# Patient Record
Sex: FEMALE | Race: WHITE | NOT HISPANIC OR LATINO
[De-identification: names, ages, dates, MRNs, and addresses within clinical notes are randomized per-mention and may not be internally consistent; named-entity substitution may affect disease eponyms.]

---

## 2023-11-13 ENCOUNTER — TRANSCRIPTION ENCOUNTER (OUTPATIENT)
Age: 38
End: 2023-11-13

## 2023-11-14 ENCOUNTER — TRANSCRIPTION ENCOUNTER (OUTPATIENT)
Age: 38
End: 2023-11-14

## 2023-11-14 ENCOUNTER — RESULT REVIEW (OUTPATIENT)
Age: 38
End: 2023-11-14

## 2023-11-14 ENCOUNTER — INPATIENT (INPATIENT)
Facility: HOSPITAL | Age: 38
LOS: 0 days | Discharge: HOME | End: 2023-11-15
Attending: SURGERY | Admitting: SURGERY
Payer: MEDICAID

## 2023-11-14 VITALS
OXYGEN SATURATION: 100 % | HEIGHT: 68 IN | RESPIRATION RATE: 16 BRPM | TEMPERATURE: 98 F | WEIGHT: 240.08 LBS | HEART RATE: 77 BPM | DIASTOLIC BLOOD PRESSURE: 103 MMHG | SYSTOLIC BLOOD PRESSURE: 165 MMHG

## 2023-11-14 DIAGNOSIS — Z98.84 BARIATRIC SURGERY STATUS: Chronic | ICD-10-CM

## 2023-11-14 LAB
ALBUMIN SERPL ELPH-MCNC: 3.7 G/DL — SIGNIFICANT CHANGE UP (ref 3.3–5)
ALBUMIN SERPL ELPH-MCNC: 3.7 G/DL — SIGNIFICANT CHANGE UP (ref 3.3–5)
ALP SERPL-CCNC: 67 U/L — SIGNIFICANT CHANGE UP (ref 40–120)
ALP SERPL-CCNC: 67 U/L — SIGNIFICANT CHANGE UP (ref 40–120)
ALT FLD-CCNC: 27 U/L — SIGNIFICANT CHANGE UP (ref 12–78)
ALT FLD-CCNC: 27 U/L — SIGNIFICANT CHANGE UP (ref 12–78)
ANION GAP SERPL CALC-SCNC: 6 MMOL/L — SIGNIFICANT CHANGE UP (ref 5–17)
ANION GAP SERPL CALC-SCNC: 6 MMOL/L — SIGNIFICANT CHANGE UP (ref 5–17)
APPEARANCE UR: CLEAR — SIGNIFICANT CHANGE UP
APPEARANCE UR: CLEAR — SIGNIFICANT CHANGE UP
APTT BLD: 31.9 SEC — SIGNIFICANT CHANGE UP (ref 24.5–35.6)
APTT BLD: 31.9 SEC — SIGNIFICANT CHANGE UP (ref 24.5–35.6)
AST SERPL-CCNC: 15 U/L — SIGNIFICANT CHANGE UP (ref 15–37)
AST SERPL-CCNC: 15 U/L — SIGNIFICANT CHANGE UP (ref 15–37)
BASOPHILS # BLD AUTO: 0.02 K/UL — SIGNIFICANT CHANGE UP (ref 0–0.2)
BASOPHILS # BLD AUTO: 0.02 K/UL — SIGNIFICANT CHANGE UP (ref 0–0.2)
BASOPHILS NFR BLD AUTO: 0.2 % — SIGNIFICANT CHANGE UP (ref 0–2)
BASOPHILS NFR BLD AUTO: 0.2 % — SIGNIFICANT CHANGE UP (ref 0–2)
BILIRUB SERPL-MCNC: 0.8 MG/DL — SIGNIFICANT CHANGE UP (ref 0.2–1.2)
BILIRUB SERPL-MCNC: 0.8 MG/DL — SIGNIFICANT CHANGE UP (ref 0.2–1.2)
BILIRUB UR-MCNC: NEGATIVE — SIGNIFICANT CHANGE UP
BILIRUB UR-MCNC: NEGATIVE — SIGNIFICANT CHANGE UP
BLD GP AB SCN SERPL QL: SIGNIFICANT CHANGE UP
BLD GP AB SCN SERPL QL: SIGNIFICANT CHANGE UP
BUN SERPL-MCNC: 7 MG/DL — SIGNIFICANT CHANGE UP (ref 7–23)
BUN SERPL-MCNC: 7 MG/DL — SIGNIFICANT CHANGE UP (ref 7–23)
CALCIUM SERPL-MCNC: 9 MG/DL — SIGNIFICANT CHANGE UP (ref 8.5–10.1)
CALCIUM SERPL-MCNC: 9 MG/DL — SIGNIFICANT CHANGE UP (ref 8.5–10.1)
CHLORIDE SERPL-SCNC: 102 MMOL/L — SIGNIFICANT CHANGE UP (ref 96–108)
CHLORIDE SERPL-SCNC: 102 MMOL/L — SIGNIFICANT CHANGE UP (ref 96–108)
CO2 SERPL-SCNC: 28 MMOL/L — SIGNIFICANT CHANGE UP (ref 22–31)
CO2 SERPL-SCNC: 28 MMOL/L — SIGNIFICANT CHANGE UP (ref 22–31)
COLOR SPEC: YELLOW — SIGNIFICANT CHANGE UP
COLOR SPEC: YELLOW — SIGNIFICANT CHANGE UP
CREAT SERPL-MCNC: 0.72 MG/DL — SIGNIFICANT CHANGE UP (ref 0.5–1.3)
CREAT SERPL-MCNC: 0.72 MG/DL — SIGNIFICANT CHANGE UP (ref 0.5–1.3)
DIFF PNL FLD: NEGATIVE — SIGNIFICANT CHANGE UP
DIFF PNL FLD: NEGATIVE — SIGNIFICANT CHANGE UP
EGFR: 110 ML/MIN/1.73M2 — SIGNIFICANT CHANGE UP
EGFR: 110 ML/MIN/1.73M2 — SIGNIFICANT CHANGE UP
EOSINOPHIL # BLD AUTO: 0.03 K/UL — SIGNIFICANT CHANGE UP (ref 0–0.5)
EOSINOPHIL # BLD AUTO: 0.03 K/UL — SIGNIFICANT CHANGE UP (ref 0–0.5)
EOSINOPHIL NFR BLD AUTO: 0.3 % — SIGNIFICANT CHANGE UP (ref 0–6)
EOSINOPHIL NFR BLD AUTO: 0.3 % — SIGNIFICANT CHANGE UP (ref 0–6)
GLUCOSE SERPL-MCNC: 97 MG/DL — SIGNIFICANT CHANGE UP (ref 70–99)
GLUCOSE SERPL-MCNC: 97 MG/DL — SIGNIFICANT CHANGE UP (ref 70–99)
GLUCOSE UR QL: NEGATIVE MG/DL — SIGNIFICANT CHANGE UP
GLUCOSE UR QL: NEGATIVE MG/DL — SIGNIFICANT CHANGE UP
HCG SERPL-ACNC: <1 MIU/ML — SIGNIFICANT CHANGE UP
HCG SERPL-ACNC: <1 MIU/ML — SIGNIFICANT CHANGE UP
HCT VFR BLD CALC: 34.1 % — LOW (ref 34.5–45)
HCT VFR BLD CALC: 34.1 % — LOW (ref 34.5–45)
HGB BLD-MCNC: 11.7 G/DL — SIGNIFICANT CHANGE UP (ref 11.5–15.5)
HGB BLD-MCNC: 11.7 G/DL — SIGNIFICANT CHANGE UP (ref 11.5–15.5)
IMM GRANULOCYTES NFR BLD AUTO: 0.3 % — SIGNIFICANT CHANGE UP (ref 0–0.9)
IMM GRANULOCYTES NFR BLD AUTO: 0.3 % — SIGNIFICANT CHANGE UP (ref 0–0.9)
INR BLD: 1.02 RATIO — SIGNIFICANT CHANGE UP (ref 0.85–1.18)
INR BLD: 1.02 RATIO — SIGNIFICANT CHANGE UP (ref 0.85–1.18)
KETONES UR-MCNC: NEGATIVE MG/DL — SIGNIFICANT CHANGE UP
KETONES UR-MCNC: NEGATIVE MG/DL — SIGNIFICANT CHANGE UP
LACTATE SERPL-SCNC: 0.6 MMOL/L — LOW (ref 0.7–2)
LACTATE SERPL-SCNC: 0.6 MMOL/L — LOW (ref 0.7–2)
LEUKOCYTE ESTERASE UR-ACNC: NEGATIVE — SIGNIFICANT CHANGE UP
LEUKOCYTE ESTERASE UR-ACNC: NEGATIVE — SIGNIFICANT CHANGE UP
LIDOCAIN IGE QN: 28 U/L — SIGNIFICANT CHANGE UP (ref 13–75)
LIDOCAIN IGE QN: 28 U/L — SIGNIFICANT CHANGE UP (ref 13–75)
LYMPHOCYTES # BLD AUTO: 1.83 K/UL — SIGNIFICANT CHANGE UP (ref 1–3.3)
LYMPHOCYTES # BLD AUTO: 1.83 K/UL — SIGNIFICANT CHANGE UP (ref 1–3.3)
LYMPHOCYTES # BLD AUTO: 20.7 % — SIGNIFICANT CHANGE UP (ref 13–44)
LYMPHOCYTES # BLD AUTO: 20.7 % — SIGNIFICANT CHANGE UP (ref 13–44)
MCHC RBC-ENTMCNC: 28.2 PG — SIGNIFICANT CHANGE UP (ref 27–34)
MCHC RBC-ENTMCNC: 28.2 PG — SIGNIFICANT CHANGE UP (ref 27–34)
MCHC RBC-ENTMCNC: 34.3 G/DL — SIGNIFICANT CHANGE UP (ref 32–36)
MCHC RBC-ENTMCNC: 34.3 G/DL — SIGNIFICANT CHANGE UP (ref 32–36)
MCV RBC AUTO: 82.2 FL — SIGNIFICANT CHANGE UP (ref 80–100)
MCV RBC AUTO: 82.2 FL — SIGNIFICANT CHANGE UP (ref 80–100)
MONOCYTES # BLD AUTO: 0.79 K/UL — SIGNIFICANT CHANGE UP (ref 0–0.9)
MONOCYTES # BLD AUTO: 0.79 K/UL — SIGNIFICANT CHANGE UP (ref 0–0.9)
MONOCYTES NFR BLD AUTO: 8.9 % — SIGNIFICANT CHANGE UP (ref 2–14)
MONOCYTES NFR BLD AUTO: 8.9 % — SIGNIFICANT CHANGE UP (ref 2–14)
NEUTROPHILS # BLD AUTO: 6.15 K/UL — SIGNIFICANT CHANGE UP (ref 1.8–7.4)
NEUTROPHILS # BLD AUTO: 6.15 K/UL — SIGNIFICANT CHANGE UP (ref 1.8–7.4)
NEUTROPHILS NFR BLD AUTO: 69.6 % — SIGNIFICANT CHANGE UP (ref 43–77)
NEUTROPHILS NFR BLD AUTO: 69.6 % — SIGNIFICANT CHANGE UP (ref 43–77)
NITRITE UR-MCNC: NEGATIVE — SIGNIFICANT CHANGE UP
NITRITE UR-MCNC: NEGATIVE — SIGNIFICANT CHANGE UP
NRBC # BLD: 0 /100 WBCS — SIGNIFICANT CHANGE UP (ref 0–0)
NRBC # BLD: 0 /100 WBCS — SIGNIFICANT CHANGE UP (ref 0–0)
PH UR: 7 — SIGNIFICANT CHANGE UP (ref 5–8)
PH UR: 7 — SIGNIFICANT CHANGE UP (ref 5–8)
PLATELET # BLD AUTO: 274 K/UL — SIGNIFICANT CHANGE UP (ref 150–400)
PLATELET # BLD AUTO: 274 K/UL — SIGNIFICANT CHANGE UP (ref 150–400)
POTASSIUM SERPL-MCNC: 3.5 MMOL/L — SIGNIFICANT CHANGE UP (ref 3.5–5.3)
POTASSIUM SERPL-MCNC: 3.5 MMOL/L — SIGNIFICANT CHANGE UP (ref 3.5–5.3)
POTASSIUM SERPL-SCNC: 3.5 MMOL/L — SIGNIFICANT CHANGE UP (ref 3.5–5.3)
POTASSIUM SERPL-SCNC: 3.5 MMOL/L — SIGNIFICANT CHANGE UP (ref 3.5–5.3)
PROT SERPL-MCNC: 7.5 GM/DL — SIGNIFICANT CHANGE UP (ref 6–8.3)
PROT SERPL-MCNC: 7.5 GM/DL — SIGNIFICANT CHANGE UP (ref 6–8.3)
PROT UR-MCNC: NEGATIVE MG/DL — SIGNIFICANT CHANGE UP
PROT UR-MCNC: NEGATIVE MG/DL — SIGNIFICANT CHANGE UP
PROTHROM AB SERPL-ACNC: 12.1 SEC — SIGNIFICANT CHANGE UP (ref 9.5–13)
PROTHROM AB SERPL-ACNC: 12.1 SEC — SIGNIFICANT CHANGE UP (ref 9.5–13)
RBC # BLD: 4.15 M/UL — SIGNIFICANT CHANGE UP (ref 3.8–5.2)
RBC # BLD: 4.15 M/UL — SIGNIFICANT CHANGE UP (ref 3.8–5.2)
RBC # FLD: 13.2 % — SIGNIFICANT CHANGE UP (ref 10.3–14.5)
RBC # FLD: 13.2 % — SIGNIFICANT CHANGE UP (ref 10.3–14.5)
SODIUM SERPL-SCNC: 136 MMOL/L — SIGNIFICANT CHANGE UP (ref 135–145)
SODIUM SERPL-SCNC: 136 MMOL/L — SIGNIFICANT CHANGE UP (ref 135–145)
SP GR SPEC: 1.01 — SIGNIFICANT CHANGE UP (ref 1–1.03)
SP GR SPEC: 1.01 — SIGNIFICANT CHANGE UP (ref 1–1.03)
UROBILINOGEN FLD QL: 1 MG/DL — SIGNIFICANT CHANGE UP (ref 0.2–1)
UROBILINOGEN FLD QL: 1 MG/DL — SIGNIFICANT CHANGE UP (ref 0.2–1)
WBC # BLD: 8.85 K/UL — SIGNIFICANT CHANGE UP (ref 3.8–10.5)
WBC # BLD: 8.85 K/UL — SIGNIFICANT CHANGE UP (ref 3.8–10.5)
WBC # FLD AUTO: 8.85 K/UL — SIGNIFICANT CHANGE UP (ref 3.8–10.5)
WBC # FLD AUTO: 8.85 K/UL — SIGNIFICANT CHANGE UP (ref 3.8–10.5)

## 2023-11-14 PROCEDURE — 88300 SURGICAL PATH GROSS: CPT | Mod: 26

## 2023-11-14 PROCEDURE — 99223 1ST HOSP IP/OBS HIGH 75: CPT | Mod: 57,25

## 2023-11-14 PROCEDURE — 43772 LAP RMVL GASTR ADJ DEVICE: CPT | Mod: AS

## 2023-11-14 PROCEDURE — 43774 LAP RMVL GASTR ADJ ALL PARTS: CPT

## 2023-11-14 PROCEDURE — 71045 X-RAY EXAM CHEST 1 VIEW: CPT | Mod: 26

## 2023-11-14 PROCEDURE — 43235 EGD DIAGNOSTIC BRUSH WASH: CPT

## 2023-11-14 PROCEDURE — 74177 CT ABD & PELVIS W/CONTRAST: CPT | Mod: 26,MC

## 2023-11-14 PROCEDURE — 93010 ELECTROCARDIOGRAM REPORT: CPT

## 2023-11-14 PROCEDURE — 99285 EMERGENCY DEPT VISIT HI MDM: CPT

## 2023-11-14 RX ORDER — HYDROMORPHONE HYDROCHLORIDE 2 MG/ML
1 INJECTION INTRAMUSCULAR; INTRAVENOUS; SUBCUTANEOUS EVERY 6 HOURS
Refills: 0 | Status: DISCONTINUED | OUTPATIENT
Start: 2023-11-14 | End: 2023-11-14

## 2023-11-14 RX ORDER — HEPARIN SODIUM 5000 [USP'U]/ML
5000 INJECTION INTRAVENOUS; SUBCUTANEOUS EVERY 8 HOURS
Refills: 0 | Status: DISCONTINUED | OUTPATIENT
Start: 2023-11-15 | End: 2023-11-15

## 2023-11-14 RX ORDER — ONDANSETRON 8 MG/1
4 TABLET, FILM COATED ORAL ONCE
Refills: 0 | Status: COMPLETED | OUTPATIENT
Start: 2023-11-14 | End: 2023-11-14

## 2023-11-14 RX ORDER — SODIUM CHLORIDE 9 MG/ML
1000 INJECTION, SOLUTION INTRAVENOUS
Refills: 0 | Status: DISCONTINUED | OUTPATIENT
Start: 2023-11-14 | End: 2023-11-15

## 2023-11-14 RX ORDER — FAMOTIDINE 10 MG/ML
20 INJECTION INTRAVENOUS ONCE
Refills: 0 | Status: COMPLETED | OUTPATIENT
Start: 2023-11-14 | End: 2023-11-14

## 2023-11-14 RX ORDER — ACETAMINOPHEN 500 MG
1000 TABLET ORAL ONCE
Refills: 0 | Status: COMPLETED | OUTPATIENT
Start: 2023-11-15 | End: 2023-11-15

## 2023-11-14 RX ORDER — CEFAZOLIN SODIUM 1 G
2000 VIAL (EA) INJECTION EVERY 8 HOURS
Refills: 0 | Status: COMPLETED | OUTPATIENT
Start: 2023-11-14 | End: 2023-11-15

## 2023-11-14 RX ORDER — ACETAMINOPHEN 500 MG
1000 TABLET ORAL ONCE
Refills: 0 | Status: COMPLETED | OUTPATIENT
Start: 2023-11-14 | End: 2023-11-14

## 2023-11-14 RX ORDER — OXYCODONE HYDROCHLORIDE 5 MG/1
5 TABLET ORAL EVERY 4 HOURS
Refills: 0 | Status: DISCONTINUED | OUTPATIENT
Start: 2023-11-14 | End: 2023-11-15

## 2023-11-14 RX ORDER — ONDANSETRON 8 MG/1
4 TABLET, FILM COATED ORAL ONCE
Refills: 0 | Status: DISCONTINUED | OUTPATIENT
Start: 2023-11-14 | End: 2023-11-14

## 2023-11-14 RX ORDER — SODIUM CHLORIDE 9 MG/ML
1000 INJECTION INTRAMUSCULAR; INTRAVENOUS; SUBCUTANEOUS ONCE
Refills: 0 | Status: COMPLETED | OUTPATIENT
Start: 2023-11-14 | End: 2023-11-14

## 2023-11-14 RX ORDER — IOHEXOL 300 MG/ML
30 INJECTION, SOLUTION INTRAVENOUS ONCE
Refills: 0 | Status: COMPLETED | OUTPATIENT
Start: 2023-11-14 | End: 2023-11-14

## 2023-11-14 RX ORDER — SODIUM CHLORIDE 9 MG/ML
2000 INJECTION, SOLUTION INTRAVENOUS ONCE
Refills: 0 | Status: COMPLETED | OUTPATIENT
Start: 2023-11-14 | End: 2023-11-14

## 2023-11-14 RX ORDER — SODIUM CHLORIDE 9 MG/ML
1000 INJECTION, SOLUTION INTRAVENOUS
Refills: 0 | Status: DISCONTINUED | OUTPATIENT
Start: 2023-11-14 | End: 2023-11-14

## 2023-11-14 RX ORDER — HYDROMORPHONE HYDROCHLORIDE 2 MG/ML
0.5 INJECTION INTRAMUSCULAR; INTRAVENOUS; SUBCUTANEOUS
Refills: 0 | Status: DISCONTINUED | OUTPATIENT
Start: 2023-11-14 | End: 2023-11-14

## 2023-11-14 RX ORDER — HYDROMORPHONE HYDROCHLORIDE 2 MG/ML
1 INJECTION INTRAMUSCULAR; INTRAVENOUS; SUBCUTANEOUS
Refills: 0 | Status: DISCONTINUED | OUTPATIENT
Start: 2023-11-14 | End: 2023-11-14

## 2023-11-14 RX ORDER — HEPARIN SODIUM 5000 [USP'U]/ML
5000 INJECTION INTRAVENOUS; SUBCUTANEOUS ONCE
Refills: 0 | Status: DISCONTINUED | OUTPATIENT
Start: 2023-11-14 | End: 2023-11-14

## 2023-11-14 RX ORDER — OXYCODONE HYDROCHLORIDE 5 MG/1
10 TABLET ORAL EVERY 6 HOURS
Refills: 0 | Status: DISCONTINUED | OUTPATIENT
Start: 2023-11-14 | End: 2023-11-15

## 2023-11-14 RX ADMIN — HYDROMORPHONE HYDROCHLORIDE 0.5 MILLIGRAM(S): 2 INJECTION INTRAMUSCULAR; INTRAVENOUS; SUBCUTANEOUS at 22:26

## 2023-11-14 RX ADMIN — SODIUM CHLORIDE 2000 MILLILITER(S): 9 INJECTION, SOLUTION INTRAVENOUS at 18:24

## 2023-11-14 RX ADMIN — SODIUM CHLORIDE 1000 MILLILITER(S): 9 INJECTION INTRAMUSCULAR; INTRAVENOUS; SUBCUTANEOUS at 12:57

## 2023-11-14 RX ADMIN — Medication 400 MILLIGRAM(S): at 22:54

## 2023-11-14 RX ADMIN — HYDROMORPHONE HYDROCHLORIDE 0.5 MILLIGRAM(S): 2 INJECTION INTRAMUSCULAR; INTRAVENOUS; SUBCUTANEOUS at 22:09

## 2023-11-14 RX ADMIN — FAMOTIDINE 20 MILLIGRAM(S): 10 INJECTION INTRAVENOUS at 12:57

## 2023-11-14 RX ADMIN — ONDANSETRON 4 MILLIGRAM(S): 8 TABLET, FILM COATED ORAL at 23:30

## 2023-11-14 RX ADMIN — IOHEXOL 30 MILLILITER(S): 300 INJECTION, SOLUTION INTRAVENOUS at 13:06

## 2023-11-14 RX ADMIN — Medication 1000 MILLIGRAM(S): at 23:54

## 2023-11-14 RX ADMIN — ONDANSETRON 4 MILLIGRAM(S): 8 TABLET, FILM COATED ORAL at 12:57

## 2023-11-14 RX ADMIN — SODIUM CHLORIDE 100 MILLILITER(S): 9 INJECTION, SOLUTION INTRAVENOUS at 22:00

## 2023-11-14 RX ADMIN — SODIUM CHLORIDE 1000 MILLILITER(S): 9 INJECTION INTRAMUSCULAR; INTRAVENOUS; SUBCUTANEOUS at 13:52

## 2023-11-14 RX ADMIN — SODIUM CHLORIDE 150 MILLILITER(S): 9 INJECTION, SOLUTION INTRAVENOUS at 23:00

## 2023-11-14 NOTE — ED ADULT TRIAGE NOTE - CHIEF COMPLAINT QUOTE
Patient states I have a lap band placed in 2013 and have epigastric and upper abd pain, has not eaten in 6 days and has been vomiting. Hx iron deficiency, last transfusion 9 months ago

## 2023-11-14 NOTE — ED PROVIDER NOTE - CLINICAL SUMMARY MEDICAL DECISION MAKING FREE TEXT BOX
38F PMH anemia, PSH lap band pw N/V x6 days, unable to tolerate PO intake. Afebrile, VSS. Well appearing, in NAD. Exam as noted in PE. Plan for CBC, CMP, lipase, lactate, HCG, coags, T&S, UA/C & CT AP w/ PO & IV contrast. Give IVF, Pepcid, Zofran. Re-eval. 38F PMH anemia, PSH lap band pw N/V x6 days, unable to tolerate PO intake. Afebrile, VSS. Well appearing, in NAD. Exam as noted in PE. Plan for CBC, CMP, lipase, lactate, HCG, coags, T&S, UA/C & CT AP w/ PO & IV contrast. Give IVF, Pepcid, Zofran. Re-eval.  W/u significant for: C 38F PMH anemia, PSH lap band pw N/V x6 days, unable to tolerate PO intake. Afebrile, VSS. Well appearing, in NAD. Exam as noted in PE. Plan for CBC, CMP, lipase, lactate, HCG, coags, T&S, UA/C & CT AP w/ PO & IV contrast. Give IVF, Pepcid, Zofran. Re-eval.  W/u significant for: CT AP w/ + gastric band slippage inferiorly, apparent erosion of the gastric band into the wall of the greater curvature of the stomach.  D/w [facility]: 38F PMH anemia, PSH lap band pw N/V x6 days, unable to tolerate PO intake. Afebrile, VSS. Well appearing, in NAD. Exam as noted in PE. Plan for CBC, CMP, lipase, lactate, HCG, coags, T&S, UA/C & CT AP w/ PO & IV contrast. Give IVF, Pepcid, Zofran. Re-eval.  W/u significant for: CT AP w/ + gastric band slippage inferiorly, apparent erosion of the gastric band into the wall of the greater curvature of the stomach.  D/w Dr Veloz ([facility]): Will admit to surgical service, plan for OR today. Pt updated to results, admission & plan for OR. She understands / agrees w/ this plan.

## 2023-11-14 NOTE — H&P ADULT - NSHPPHYSICALEXAM_GEN_ALL_CORE
Constitutional: WDWN resting comfortably in bed; NAD  HEENT: NC/AT, PERRL, EOMI, anicteric sclera, no nasal discharge; uvula midline, no oropharyngeal erythema or exudates  Neck: supple; no JVD or thyromegaly  Respiratory: CTA B/L; no W/R/R, no retractions  Cardiac: +S1/S2; RRR; no M/R/G; PMI non-displaced  Gastrointestinal: soft, + LUQ and epigastric TTP  Extremities: , no clubbing or cyanosis; no peripheral edema  Musculoskeletal:  no joint swelling, tenderness or erythema  Vascular: 2+ radial, femoral, DP/PT pulses B/L  Skin: warm, dry and intact; no rashes, wounds, or scars  Neurologic: AAOx3; CNS grossly intact; no focal deficits

## 2023-11-14 NOTE — ED PROVIDER NOTE - OBJECTIVE STATEMENT
38F PMH iron-deficiency anemia pw N/V, unable to tolerate PO intake x6 days. Pt endorses chest pressure / sticking sensation w/ attempted PO intake, followed by emesis. + LUQ pain. Pt reports awakening w/ choking sensation at night. + mental fog. Denies F/C, h/a, dizziness, CP, SOB, cough, flank pain, diarrhea, constipation, black or bloody BMs, dysuria, hematuria, LE pain / swelling. Pt reports normal BMs, but decreased urination 2/2 poor PO intake. Denies recent travel, sick contacts, changes in diet. Pt states w/o iron-transfusion x10mo, pt moved to [place] from [place] 5mo ago. Pt states was going for iron-transfusion 3x/mo previously.     PMH as above, PSH lap band 2013 ([place], [place]), NKDA, meds as listed. LMP 11/5. 38F PMH iron-deficiency anemia pw N/V, unable to tolerate PO intake x6 days. Pt endorses chest pressure / sticking sensation w/ attempted PO intake, followed by emesis. + LUQ pain. Pt reports awakening w/ choking sensation at night. + mental fog. Denies F/C, h/a, dizziness, CP, SOB, cough, flank pain, diarrhea, constipation, black or bloody BMs, dysuria, hematuria, LE pain / swelling. Pt reports normal BMs, but decreased urination 2/2 poor PO intake. Denies recent travel, sick contacts, changes in diet. Pt states w/o iron-transfusion x10mo, pt moved to [place] from [place] 5mo ago. Pt states was going for iron-transfusion 3x/mo previously.     PMH as above, PSH lap band 2013 ([place], [place]), NKDA, intolerance - ASA, Meds none. LMP 11/5, denies possibility of pregnancy.

## 2023-11-14 NOTE — H&P ADULT - ASSESSMENT
37 y/o female with Gastric band malfunction   PMH Laparoscopic Gastric band for morbid obesity, LUPE    NPO, IVF resuscitation  booked for Emergent Laparoscopic removal of Gastric Band,  Upper endoscopy , poss. exploratory laparotomy,   Discussed with Dr Veloz   39 y/o female with Gastric band malfunction   PMH Laparoscopic Gastric band for morbid obesity, LUPE    NPO, IVF resuscitation  booked for Emergent Laparoscopic removal of Gastric Band,  Upper endoscopy , Possible exploratory laparotomy,   Discussed with Dr Veloz

## 2023-11-14 NOTE — H&P ADULT - HISTORY OF PRESENT ILLNESS
38F PMH iron-deficiency anemia presented to ER with C/O N/V, unable to tolerate PO intake x6 days. Patient stated that she has stabbing epigastric and LUQ pain when she tries to eat PO intake, followed by emesis. Denies Fever, chills, Chest pain, SOB, diarrhea, constipation, hematochezia or melena.

## 2023-11-14 NOTE — ED PROVIDER NOTE - PHYSICAL EXAMINATION
GEN: Awake, alert, interactive, NAD.  HEAD AND NECK: NC/AT. Airway patent. Neck supple.   EYES:  Clear b/l. EOMI. PERRL.   ENT: Moist mucus membranes.   CARDIAC: Regular rate, regular rhythm. No evident pedal edema.    RESP/CHEST: Normal respiratory effort with no use of accessory muscles or retractions. Clear throughout on auscultation.  ABD: Soft, non-distended, + TTP LUQ. No rebound, no guarding. + firm mass palpable LUQ c/w lap band hx.   BACK: No midline spinal TTP. No CVAT.   EXTREMITIES: Moving all extremities with no apparent deformities.   SKIN: Warm, dry, intact normal color. No rash.   NEURO: AOx3, CN II-XII grossly intact, no focal deficits.   PSYCH: Appropriate mood and affect.

## 2023-11-14 NOTE — ED ADULT NURSE NOTE - OBJECTIVE STATEMENT
patient alert and oriented x4. patient 39 yo female c/o abd pain for the past 6 days. patient states she has history of lap band surgery in 2013 in [place] and had no complications. states she started having abd pain, and N/V r/t being unable to tolerate PO intake. pain in LUQ, pain 7/10. pale eye mucosa noted. patient also states she has history of iron deficiency and was receiving iron transfusion at her old hematologist in [place], but has not had an iron infusion in the past 10 months.

## 2023-11-14 NOTE — ED ADULT NURSE REASSESSMENT NOTE - NS ED NURSE REASSESS COMMENT FT1
Report received from RN at 7pm. Assessment available on KB. will continue to monitor. Patient awaiting transport to OR, Spoke with OR team at this time, transport pending, patient understanding at this time

## 2023-11-15 ENCOUNTER — TRANSCRIPTION ENCOUNTER (OUTPATIENT)
Age: 38
End: 2023-11-15

## 2023-11-15 VITALS
OXYGEN SATURATION: 97 % | TEMPERATURE: 99 F | RESPIRATION RATE: 16 BRPM | HEART RATE: 73 BPM | SYSTOLIC BLOOD PRESSURE: 113 MMHG | DIASTOLIC BLOOD PRESSURE: 73 MMHG

## 2023-11-15 LAB
ANION GAP SERPL CALC-SCNC: 2 MMOL/L — LOW (ref 5–17)
ANION GAP SERPL CALC-SCNC: 2 MMOL/L — LOW (ref 5–17)
BASOPHILS # BLD AUTO: 0.01 K/UL — SIGNIFICANT CHANGE UP (ref 0–0.2)
BASOPHILS # BLD AUTO: 0.01 K/UL — SIGNIFICANT CHANGE UP (ref 0–0.2)
BASOPHILS NFR BLD AUTO: 0.1 % — SIGNIFICANT CHANGE UP (ref 0–2)
BASOPHILS NFR BLD AUTO: 0.1 % — SIGNIFICANT CHANGE UP (ref 0–2)
BUN SERPL-MCNC: 8 MG/DL — SIGNIFICANT CHANGE UP (ref 7–23)
BUN SERPL-MCNC: 8 MG/DL — SIGNIFICANT CHANGE UP (ref 7–23)
CALCIUM SERPL-MCNC: 8.3 MG/DL — LOW (ref 8.5–10.1)
CALCIUM SERPL-MCNC: 8.3 MG/DL — LOW (ref 8.5–10.1)
CHLORIDE SERPL-SCNC: 107 MMOL/L — SIGNIFICANT CHANGE UP (ref 96–108)
CHLORIDE SERPL-SCNC: 107 MMOL/L — SIGNIFICANT CHANGE UP (ref 96–108)
CO2 SERPL-SCNC: 28 MMOL/L — SIGNIFICANT CHANGE UP (ref 22–31)
CO2 SERPL-SCNC: 28 MMOL/L — SIGNIFICANT CHANGE UP (ref 22–31)
CREAT SERPL-MCNC: 0.56 MG/DL — SIGNIFICANT CHANGE UP (ref 0.5–1.3)
CREAT SERPL-MCNC: 0.56 MG/DL — SIGNIFICANT CHANGE UP (ref 0.5–1.3)
EGFR: 120 ML/MIN/1.73M2 — SIGNIFICANT CHANGE UP
EGFR: 120 ML/MIN/1.73M2 — SIGNIFICANT CHANGE UP
EOSINOPHIL # BLD AUTO: 0 K/UL — SIGNIFICANT CHANGE UP (ref 0–0.5)
EOSINOPHIL # BLD AUTO: 0 K/UL — SIGNIFICANT CHANGE UP (ref 0–0.5)
EOSINOPHIL NFR BLD AUTO: 0 % — SIGNIFICANT CHANGE UP (ref 0–6)
EOSINOPHIL NFR BLD AUTO: 0 % — SIGNIFICANT CHANGE UP (ref 0–6)
GLUCOSE SERPL-MCNC: 120 MG/DL — HIGH (ref 70–99)
GLUCOSE SERPL-MCNC: 120 MG/DL — HIGH (ref 70–99)
HCT VFR BLD CALC: 31.8 % — LOW (ref 34.5–45)
HCT VFR BLD CALC: 31.8 % — LOW (ref 34.5–45)
HGB BLD-MCNC: 10.7 G/DL — LOW (ref 11.5–15.5)
HGB BLD-MCNC: 10.7 G/DL — LOW (ref 11.5–15.5)
IMM GRANULOCYTES NFR BLD AUTO: 0.5 % — SIGNIFICANT CHANGE UP (ref 0–0.9)
IMM GRANULOCYTES NFR BLD AUTO: 0.5 % — SIGNIFICANT CHANGE UP (ref 0–0.9)
LYMPHOCYTES # BLD AUTO: 0.83 K/UL — LOW (ref 1–3.3)
LYMPHOCYTES # BLD AUTO: 0.83 K/UL — LOW (ref 1–3.3)
LYMPHOCYTES # BLD AUTO: 10.5 % — LOW (ref 13–44)
LYMPHOCYTES # BLD AUTO: 10.5 % — LOW (ref 13–44)
MAGNESIUM SERPL-MCNC: 2.1 MG/DL — SIGNIFICANT CHANGE UP (ref 1.6–2.6)
MAGNESIUM SERPL-MCNC: 2.1 MG/DL — SIGNIFICANT CHANGE UP (ref 1.6–2.6)
MCHC RBC-ENTMCNC: 27.9 PG — SIGNIFICANT CHANGE UP (ref 27–34)
MCHC RBC-ENTMCNC: 27.9 PG — SIGNIFICANT CHANGE UP (ref 27–34)
MCHC RBC-ENTMCNC: 33.6 G/DL — SIGNIFICANT CHANGE UP (ref 32–36)
MCHC RBC-ENTMCNC: 33.6 G/DL — SIGNIFICANT CHANGE UP (ref 32–36)
MCV RBC AUTO: 82.8 FL — SIGNIFICANT CHANGE UP (ref 80–100)
MCV RBC AUTO: 82.8 FL — SIGNIFICANT CHANGE UP (ref 80–100)
MONOCYTES # BLD AUTO: 0.18 K/UL — SIGNIFICANT CHANGE UP (ref 0–0.9)
MONOCYTES # BLD AUTO: 0.18 K/UL — SIGNIFICANT CHANGE UP (ref 0–0.9)
MONOCYTES NFR BLD AUTO: 2.3 % — SIGNIFICANT CHANGE UP (ref 2–14)
MONOCYTES NFR BLD AUTO: 2.3 % — SIGNIFICANT CHANGE UP (ref 2–14)
NEUTROPHILS # BLD AUTO: 6.87 K/UL — SIGNIFICANT CHANGE UP (ref 1.8–7.4)
NEUTROPHILS # BLD AUTO: 6.87 K/UL — SIGNIFICANT CHANGE UP (ref 1.8–7.4)
NEUTROPHILS NFR BLD AUTO: 86.6 % — HIGH (ref 43–77)
NEUTROPHILS NFR BLD AUTO: 86.6 % — HIGH (ref 43–77)
NRBC # BLD: 0 /100 WBCS — SIGNIFICANT CHANGE UP (ref 0–0)
NRBC # BLD: 0 /100 WBCS — SIGNIFICANT CHANGE UP (ref 0–0)
PHOSPHATE SERPL-MCNC: 4.4 MG/DL — SIGNIFICANT CHANGE UP (ref 2.5–4.5)
PHOSPHATE SERPL-MCNC: 4.4 MG/DL — SIGNIFICANT CHANGE UP (ref 2.5–4.5)
PLATELET # BLD AUTO: 259 K/UL — SIGNIFICANT CHANGE UP (ref 150–400)
PLATELET # BLD AUTO: 259 K/UL — SIGNIFICANT CHANGE UP (ref 150–400)
POTASSIUM SERPL-MCNC: 3.9 MMOL/L — SIGNIFICANT CHANGE UP (ref 3.5–5.3)
POTASSIUM SERPL-MCNC: 3.9 MMOL/L — SIGNIFICANT CHANGE UP (ref 3.5–5.3)
POTASSIUM SERPL-SCNC: 3.9 MMOL/L — SIGNIFICANT CHANGE UP (ref 3.5–5.3)
POTASSIUM SERPL-SCNC: 3.9 MMOL/L — SIGNIFICANT CHANGE UP (ref 3.5–5.3)
RBC # BLD: 3.84 M/UL — SIGNIFICANT CHANGE UP (ref 3.8–5.2)
RBC # BLD: 3.84 M/UL — SIGNIFICANT CHANGE UP (ref 3.8–5.2)
RBC # FLD: 13.1 % — SIGNIFICANT CHANGE UP (ref 10.3–14.5)
RBC # FLD: 13.1 % — SIGNIFICANT CHANGE UP (ref 10.3–14.5)
SODIUM SERPL-SCNC: 137 MMOL/L — SIGNIFICANT CHANGE UP (ref 135–145)
SODIUM SERPL-SCNC: 137 MMOL/L — SIGNIFICANT CHANGE UP (ref 135–145)
WBC # BLD: 7.93 K/UL — SIGNIFICANT CHANGE UP (ref 3.8–10.5)
WBC # BLD: 7.93 K/UL — SIGNIFICANT CHANGE UP (ref 3.8–10.5)
WBC # FLD AUTO: 7.93 K/UL — SIGNIFICANT CHANGE UP (ref 3.8–10.5)
WBC # FLD AUTO: 7.93 K/UL — SIGNIFICANT CHANGE UP (ref 3.8–10.5)

## 2023-11-15 PROCEDURE — 99232 SBSQ HOSP IP/OBS MODERATE 35: CPT

## 2023-11-15 RX ORDER — ONDANSETRON 8 MG/1
4 TABLET, FILM COATED ORAL ONCE
Refills: 0 | Status: DISCONTINUED | OUTPATIENT
Start: 2023-11-15 | End: 2023-11-15

## 2023-11-15 RX ORDER — KETOROLAC TROMETHAMINE 30 MG/ML
15 SYRINGE (ML) INJECTION ONCE
Refills: 0 | Status: COMPLETED | OUTPATIENT
Start: 2023-11-15 | End: 2023-11-15

## 2023-11-15 RX ORDER — KETOROLAC TROMETHAMINE 30 MG/ML
15 SYRINGE (ML) INJECTION ONCE
Refills: 0 | Status: DISCONTINUED | OUTPATIENT
Start: 2023-11-15 | End: 2023-11-15

## 2023-11-15 RX ORDER — KETOROLAC TROMETHAMINE 30 MG/ML
1 SYRINGE (ML) INJECTION
Qty: 9 | Refills: 0
Start: 2023-11-15 | End: 2023-11-17

## 2023-11-15 RX ORDER — OXYCODONE HYDROCHLORIDE 5 MG/1
1 TABLET ORAL
Qty: 20 | Refills: 0
Start: 2023-11-15 | End: 2023-11-19

## 2023-11-15 RX ADMIN — Medication 100 MILLIGRAM(S): at 03:40

## 2023-11-15 RX ADMIN — Medication 1000 MILLIGRAM(S): at 06:07

## 2023-11-15 RX ADMIN — Medication 400 MILLIGRAM(S): at 05:07

## 2023-11-15 RX ADMIN — SODIUM CHLORIDE 150 MILLILITER(S): 9 INJECTION, SOLUTION INTRAVENOUS at 00:31

## 2023-11-15 RX ADMIN — Medication 15 MILLIGRAM(S): at 14:06

## 2023-11-15 RX ADMIN — OXYCODONE HYDROCHLORIDE 10 MILLIGRAM(S): 5 TABLET ORAL at 07:13

## 2023-11-15 RX ADMIN — HEPARIN SODIUM 5000 UNIT(S): 5000 INJECTION INTRAVENOUS; SUBCUTANEOUS at 05:07

## 2023-11-15 RX ADMIN — OXYCODONE HYDROCHLORIDE 10 MILLIGRAM(S): 5 TABLET ORAL at 06:13

## 2023-11-15 NOTE — DISCHARGE NOTE PROVIDER - NSDCCPCAREPLAN_GEN_ALL_CORE_FT
PRINCIPAL DISCHARGE DIAGNOSIS  Diagnosis: Gastric band slippage  Assessment and Plan of Treatment:

## 2023-11-15 NOTE — DISCHARGE NOTE PROVIDER - NSDCMRMEDTOKEN_GEN_ALL_CORE_FT
ketorolac 10 mg oral tablet: 1 tab(s) orally every 8 hours MDD: 3  oxyCODONE 5 mg oral tablet: 1 tab(s) orally every 6 hours as needed for  severe pain MDD: 4

## 2023-11-15 NOTE — PHARMACOTHERAPY INTERVENTION NOTE - COMMENTS
Spoke to patient Stevie on 11/15/23. Patient in for Gastric Band Slippage. Patient being discharged on pain medications. Ketorolac 10mg 1TAB Q8H and Oxycodone 5mg 1 TAB PO Q6H PRN. Spoke to patient and informed to take Ketoralac for mild pain and Oxycodone for mod/severe pain. Explained potential side effects of Oxycodone (drowsiness, n/v). Recommended to take medication as directed and do not exceed 4 tablets in a day. Patient understood the counseling and had no further questions.

## 2023-11-15 NOTE — DISCHARGE NOTE NURSING/CASE MANAGEMENT/SOCIAL WORK - PATIENT PORTAL LINK FT
You can access the FollowMyHealth Patient Portal offered by [facility] by registering at the following website: [email or web address]. By joining [employer]’s FollowMyHealth portal, you will also be able to view your health information using other applications (apps) compatible with our system.

## 2023-11-15 NOTE — DISCHARGE NOTE PROVIDER - HOSPITAL COURSE
Patient was admitted to [facility] for abdominal pain. Patient underwent Laparoscopic removal of gastric band secondary to Gastric band slippage. The patient was admitted to the surgical floor for observation and pain management. The remainder of the hospital stay was uneventful and patient was stable for discharge

## 2023-11-15 NOTE — PROGRESS NOTE ADULT - ASSESSMENT
37 Y/O female POD 0 s/p endoscopy with laparoscopic gastric band removal.         PLAN:     - F/U AM labs   - Multimodal pain control  - OOB as tolerated, walking as tolerated; DVT PPx; IS to be used hourly at bedside   - Discussed with Dr. Veloz

## 2023-11-15 NOTE — PROGRESS NOTE ADULT - SUBJECTIVE AND OBJECTIVE BOX
S/P endoscopy with laparoscopic gastric band removal   38y year old Female seen and examined at bedside. Admits to abdominal pain, well controlled with medication. Denies chest pain, shortness of breath, nausea/vomiting.    Vital Signs Last 24 Hrs  T(F): 98 (11-14-23 @ 23:45), Max: 98.7 (11-14-23 @ 13:13)  HR: 65 (11-14-23 @ 23:45)  BP: 131/83 (11-14-23 @ 23:45)  RR: 16 (11-14-23 @ 23:45)  SpO2: 98% (11-14-23 @ 23:45)  Wt(kg): --   CAPILLARY BLOOD GLUCOSE          GENERAL: Alert, NAD  CHEST/LUNG: Clear to auscultation bilaterally, respirations nonlabored  HEART: +S1S2, regular rate and rhythm  ABDOMEN: soft, nondistended. Appropriate incisional tenderness. Dressings clean dry intact x 4 over trocar sites  EXTREMITIES:  no calf tenderness, no edema. Intermittent pneumatic compression devices b/l LE      S/P endoscopy with laparoscopic gastric band removal   38y year old Female seen and examined at bedside. Admits to abdominal pain, well controlled with medication. Denies chest pain, shortness of breath, nausea/vomiting.    Vital Signs Last 24 Hrs  T(F): 98 (11-14-23 @ 23:45), Max: 98.7 (11-14-23 @ 13:13)  HR: 65 (11-14-23 @ 23:45)  BP: 131/83 (11-14-23 @ 23:45)  RR: 16 (11-14-23 @ 23:45)  SpO2: 98% (11-14-23 @ 23:45)  Wt(kg): --   CAPILLARY BLOOD GLUCOSE          GENERAL: Alert, NAD  CHEST/LUNG: Clear to auscultation bilaterally, respirations nonlabored  HEART: +S1S2, regular rate and rhythm  ABDOMEN: soft, nondistended. Appropriate incisional tenderness. Incision sites C/D/I with dermabond in place  EXTREMITIES:  no calf tenderness, no edema. Intermittent pneumatic compression devices b/l LE

## 2023-11-15 NOTE — PROGRESS NOTE ADULT - SUBJECTIVE AND OBJECTIVE BOX
SURGERY PROGRESS HPI:  POD#1  Pt seen and examined at bedside. Pain is well controlled on pain medication. Pt denies complaints. Pt tolerated a few small sips of water. Pt denies nausea and vomiting. Voiding well. Pt denies chest pain, SOB, dizziness, fever, chills. Ambulating.    Vital Signs Last 24 Hrs  T(C): 36.6 (15 Nov 2023 05:45), Max: 37.1 (2023 13:13)  T(F): 97.8 (15 Nov 2023 05:45), Max: 98.7 (2023 13:13)  HR: 62 (15 Nov 2023 05:45) (62 - 98)  BP: 100/66 (15 Nov 2023 05:45) (100/66 - 165/103)  BP(mean): --  RR: 16 (15 Nov 2023 05:45) (13 - 20)  SpO2: 95% (15 Nov 2023 05:45) (95% - 100%)    Parameters below as of 15 Nov 2023 05:45  Patient On (Oxygen Delivery Method): room air      PHYSICAL EXAM:    CONSTITUTIONAL: NAD  HEENT:  Atraumatic, Normocephalic  RESPIRATORY: Clear to ausculation, bilaterally   CARDIOVASCULAR: RRR S1S2  GASTROINTESTINAL: Dressings clean/dry/intact. Nondistended, soft, appropriate incisional tenderness  MUSCULOSKELETAL: calf soft, non tender b/l    I&O's Detail      LABS:                        11.7   8.85  )-----------( 274      ( 2023 13:00 )             34.1     11-14    136  |  102  |  7   ----------------------------<  97  3.5   |  28  |  0.72    Ca    9.0      2023 13:00    TPro  7.5  /  Alb  3.7  /  TBili  0.8  /  DBili  x   /  AST  15  /  ALT  27  /  AlkPhos  67  11-14    PT/INR - ( 2023 13:00 )   PT: 12.1 sec;   INR: 1.02 ratio       PTT - ( 2023 13:00 )  PTT:31.9 sec    Urinalysis Basic - ( 2023 13:30 )    Color: Yellow / Appearance: Clear / S.008 / pH: x  Gluc: x / Ketone: Negative mg/dL  / Bili: Negative / Urobili: 1.0 mg/dL   Blood: x / Protein: Negative mg/dL / Nitrite: Negative   Leuk Esterase: Negative / RBC: x / WBC x   Sq Epi: x / Non Sq Epi: x / Bacteria: x      Assessment: 38F s/p EGD, lap band removal POD#1    Plan:  -clear liquid diet  -pain management prn  -continue DVT prophylaxis, OOB, Ambulating  -continue incentive spirometer  -continue local wound care  -f/u labs

## 2023-11-15 NOTE — DISCHARGE NOTE NURSING/CASE MANAGEMENT/SOCIAL WORK - NSDCPEFALRISK_GEN_ALL_CORE
For information on Fall & Injury Prevention, visit: [email or web address] OR  [email or web address] OR  https://www.cdc.gov/steadi/patient.html

## 2023-11-15 NOTE — DISCHARGE NOTE PROVIDER - CARE PROVIDER_API CALL
Kee Veloz  Surgery  [address]  [place] [address]  Phone: [phone]  Fax: [phone]  Follow Up Time: 1 week

## 2023-11-15 NOTE — DISCHARGE NOTE NURSING/CASE MANAGEMENT/SOCIAL WORK - NSDCVIVACCINE_GEN_ALL_CORE_FT
No Vaccines Administered. Xolair Pregnancy And Lactation Text: This medication is Pregnancy Category B and is considered safe during pregnancy. This medication is excreted in breast milk.

## 2023-11-15 NOTE — DISCHARGE NOTE PROVIDER - NSDCFUADDINST_GEN_ALL_CORE_FT
Please no heavy lifting  bending twisting pushing or  pulling ,  May Shower in 2 days keep wound clean and dry. No work or driving. Alternate with Tylenol/ Motrin for pain.  Take OTC stool softeners as needed for constipation.  Diet as tolerated. Call the doctor if any questions or concerns. Return to ER for any emergencies.  Please no heavy lifting  bending twisting pushing or  pulling ,  May Shower in 2 days keep wound clean and dry. No work or driving. Alternate with Tylenol/ toradol for pain.  Take OTC stool softeners as needed for constipation.  Diet as tolerated. Call the doctor if any questions or concerns. Return to ER for any emergencies.

## 2023-11-16 PROBLEM — E66.01 MORBID (SEVERE) OBESITY DUE TO EXCESS CALORIES: Chronic | Status: ACTIVE | Noted: 2023-11-14

## 2023-11-16 LAB
CULTURE RESULTS: NO GROWTH — SIGNIFICANT CHANGE UP
CULTURE RESULTS: NO GROWTH — SIGNIFICANT CHANGE UP
SPECIMEN SOURCE: SIGNIFICANT CHANGE UP
SPECIMEN SOURCE: SIGNIFICANT CHANGE UP
SURGICAL PATHOLOGY STUDY: SIGNIFICANT CHANGE UP
SURGICAL PATHOLOGY STUDY: SIGNIFICANT CHANGE UP

## 2023-11-17 PROBLEM — Z00.00 ENCOUNTER FOR PREVENTIVE HEALTH EXAMINATION: Status: ACTIVE | Noted: 2023-11-17

## 2023-11-19 DIAGNOSIS — D50.9 IRON DEFICIENCY ANEMIA, UNSPECIFIED: ICD-10-CM

## 2023-11-19 DIAGNOSIS — K95.09 OTHER COMPLICATIONS OF GASTRIC BAND PROCEDURE: ICD-10-CM

## 2023-11-19 DIAGNOSIS — Z88.6 ALLERGY STATUS TO ANALGESIC AGENT: ICD-10-CM

## 2023-11-20 ENCOUNTER — APPOINTMENT (OUTPATIENT)
Dept: BARIATRICS | Facility: CLINIC | Age: 38
End: 2023-11-20
Payer: MEDICAID

## 2023-11-20 ENCOUNTER — TRANSCRIPTION ENCOUNTER (OUTPATIENT)
Age: 38
End: 2023-11-20

## 2023-11-20 VITALS
OXYGEN SATURATION: 99 % | BODY MASS INDEX: 39.93 KG/M2 | TEMPERATURE: 97.3 F | HEART RATE: 79 BPM | WEIGHT: 248.44 LBS | SYSTOLIC BLOOD PRESSURE: 127 MMHG | HEIGHT: 66 IN | DIASTOLIC BLOOD PRESSURE: 83 MMHG

## 2023-11-20 DIAGNOSIS — Z82.49 FAMILY HISTORY OF ISCHEMIC HEART DISEASE AND OTHER DISEASES OF THE CIRCULATORY SYSTEM: ICD-10-CM

## 2023-11-20 DIAGNOSIS — Z81.3 FAMILY HISTORY OF OTHER PSYCHOACTIVE SUBSTANCE ABUSE AND DEPENDENCE: ICD-10-CM

## 2023-11-20 DIAGNOSIS — Z78.9 OTHER SPECIFIED HEALTH STATUS: ICD-10-CM

## 2023-11-20 PROCEDURE — 99024 POSTOP FOLLOW-UP VISIT: CPT

## 2023-11-20 RX ORDER — OMEPRAZOLE 40 MG/1
40 CAPSULE, DELAYED RELEASE ORAL
Qty: 30 | Refills: 3 | Status: ACTIVE | COMMUNITY
Start: 2023-11-20 | End: 1900-01-01

## 2023-11-20 RX ORDER — HYOSCYAMINE SULFATE 0.12 MG/1
0.12 TABLET ORAL
Qty: 42 | Refills: 0 | Status: ACTIVE | COMMUNITY
Start: 2023-11-20 | End: 1900-01-01

## 2023-11-20 RX ORDER — ONDANSETRON 4 MG/1
4 TABLET ORAL
Qty: 42 | Refills: 0 | Status: ACTIVE | COMMUNITY
Start: 2023-11-20 | End: 1900-01-01

## 2023-12-06 ENCOUNTER — APPOINTMENT (OUTPATIENT)
Dept: BARIATRICS | Facility: CLINIC | Age: 38
End: 2023-12-06
Payer: MEDICAID

## 2023-12-06 VITALS
HEART RATE: 84 BPM | HEIGHT: 66 IN | OXYGEN SATURATION: 99 % | BODY MASS INDEX: 41.62 KG/M2 | DIASTOLIC BLOOD PRESSURE: 111 MMHG | WEIGHT: 259 LBS | SYSTOLIC BLOOD PRESSURE: 156 MMHG

## 2023-12-06 PROCEDURE — 99024 POSTOP FOLLOW-UP VISIT: CPT

## 2023-12-06 RX ORDER — METOCLOPRAMIDE 5 MG/1
5 TABLET ORAL 3 TIMES DAILY
Qty: 42 | Refills: 0 | Status: ACTIVE | COMMUNITY
Start: 2023-12-06 | End: 1900-01-01

## 2023-12-14 ENCOUNTER — APPOINTMENT (OUTPATIENT)
Dept: BARIATRICS | Facility: CLINIC | Age: 38
End: 2023-12-14

## 2023-12-20 ENCOUNTER — APPOINTMENT (OUTPATIENT)
Dept: BARIATRICS | Facility: CLINIC | Age: 38
End: 2023-12-20

## 2023-12-21 ENCOUNTER — APPOINTMENT (OUTPATIENT)
Dept: BARIATRICS | Facility: CLINIC | Age: 38
End: 2023-12-21
Payer: MEDICAID

## 2023-12-21 PROCEDURE — 99024 POSTOP FOLLOW-UP VISIT: CPT

## 2024-01-03 ENCOUNTER — APPOINTMENT (OUTPATIENT)
Dept: BARIATRICS | Facility: CLINIC | Age: 39
End: 2024-01-03

## 2024-01-16 ENCOUNTER — NON-APPOINTMENT (OUTPATIENT)
Age: 39
End: 2024-01-16

## 2024-01-31 ENCOUNTER — TRANSCRIPTION ENCOUNTER (OUTPATIENT)
Age: 39
End: 2024-01-31

## 2024-01-31 ENCOUNTER — APPOINTMENT (OUTPATIENT)
Dept: BARIATRICS | Facility: CLINIC | Age: 39
End: 2024-01-31
Payer: MEDICAID

## 2024-01-31 VITALS
TEMPERATURE: 97.9 F | HEIGHT: 66 IN | HEART RATE: 71 BPM | DIASTOLIC BLOOD PRESSURE: 89 MMHG | WEIGHT: 247.19 LBS | SYSTOLIC BLOOD PRESSURE: 131 MMHG | OXYGEN SATURATION: 100 % | BODY MASS INDEX: 39.73 KG/M2

## 2024-01-31 DIAGNOSIS — F41.9 ANXIETY DISORDER, UNSPECIFIED: ICD-10-CM

## 2024-01-31 DIAGNOSIS — Z86.79 PERSONAL HISTORY OF OTHER DISEASES OF THE CIRCULATORY SYSTEM: ICD-10-CM

## 2024-01-31 DIAGNOSIS — Z87.442 PERSONAL HISTORY OF URINARY CALCULI: ICD-10-CM

## 2024-01-31 DIAGNOSIS — E66.9 OBESITY, UNSPECIFIED: ICD-10-CM

## 2024-01-31 DIAGNOSIS — D64.9 ANEMIA, UNSPECIFIED: ICD-10-CM

## 2024-01-31 DIAGNOSIS — F32.A DEPRESSION, UNSPECIFIED: ICD-10-CM

## 2024-01-31 DIAGNOSIS — K95.09 OTHER COMPLICATIONS OF GASTRIC BAND PROCEDURE: ICD-10-CM

## 2024-01-31 DIAGNOSIS — R11.2 NAUSEA WITH VOMITING, UNSPECIFIED: ICD-10-CM

## 2024-01-31 PROCEDURE — G0447 BEHAVIOR COUNSEL OBESITY 15M: CPT | Mod: 59

## 2024-01-31 PROCEDURE — 99215 OFFICE O/P EST HI 40 MIN: CPT | Mod: 25

## 2024-02-06 ENCOUNTER — APPOINTMENT (OUTPATIENT)
Dept: BARIATRICS | Facility: CLINIC | Age: 39
End: 2024-02-06

## 2024-02-08 PROBLEM — R11.2 NAUSEA AND VOMITING: Status: ACTIVE | Noted: 2023-12-06

## 2024-02-08 NOTE — REVIEW OF SYSTEMS
[Negative] : Allergic/Immunologic [MED-ROS-Cons-FT] : fatigue [MED-ROS-Genituro-FT] : kidney stones [MED-ROS-Musclo-FT] : b/l knee, back pain [MED-ROS-Psych-FT] : anxiety/depression

## 2024-02-08 NOTE — HISTORY OF PRESENT ILLNESS
[Improved Health] : Improved health [Childhood] : childhood [Lap Band] : Bariatric surgery (lap band) [Year of Surgery: _____] : Year of surgery: [unfilled] [Weight Before Surgery: ___] : Weight before surgery: [unfilled] [Lowest Weight After Surgery: ___] : Lowest weight after surgery: [unfilled] [Maintenance Weight: ___] : Maintenance weight: [unfilled] [For how long: _____] : For how long: [unfilled] [Cravings] : cravings [Change in activity level] : change in activity level [Other: ____] : [unfilled] [7] : 7 [I usually sleep 4-6 hours] : I usually sleep 4-6 hours [1 mile] : Walking distance capability:1 mile [0] : 0 [None] : none [Band with removal] : band with removal [HTN] : HTN [Other: ___] : [unfilled] [FreeTextEntry2] : 180 [FreeTextEntry3] : 158 [] : Use of assistive device, ex. walker, cane, wheelchair? No [FreeTextEntry1] : 39 year old female with anemia, and anxiety/depression presents for evaluation of weight gain. She reports struggling with her weight since childhood. In 2013, she had a gastric band placed while living in [place]. Weight prior to procedure was 400. She was able to get down to 180, before slowly starting to regain the weight and was able to maintain at 270 for about 4 years. Recently developed n/v/ abd pain and it was found that the band had slipped off. It was removed and she is motivated to work on weight loss. She is not ready for alternative surgery at this time. Her diet is varied but she often finds herself snacking on crackers/chips due to lack of time in her schedule for a meal. When she does eat it, its whatever is being served at the cafeteria at her job. She does not have time for exercise but tries to stay active. She has a long commute which impacts her sleep schedule as well.  Her menses are regular. She is not on OCPs. 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 She moved her alone from [place].  N/V/ abd pain resolved. Has h/o kidney stones.

## 2024-02-08 NOTE — ASSESSMENT
[FreeTextEntry1] : Patient with BMI of 39.9 kg/m2. We discussed the importance of weight loss in the management of her comorbidities. We discussed the risks of continued excess weight on long term health.  We discussed at length the importance of following a carbohydrate balanced diet and the importance of incorporating protein in meals. We also discussed appropriate alternative food choices. I have recommended she f/u with the RD for additional education. We discussed ways she can incorporate exercise into her daily routine. Recommended 10 min power walking or 10 min Youtube exercises. At least 15 minutes was spent during the visit on obesity counseling.  Spent time discussing how her lifestyle choices are impacting her weight. Labs to be done to r/o additional secondary causes of weight gain. Pending results will discuss medication options to help supplement patient's lifestyle modification efforts.   Emotional support provided. All of her questions were answered.  Patient to complete labs, will call with the results.  f/u pending tx plan

## 2024-02-12 ENCOUNTER — TRANSCRIPTION ENCOUNTER (OUTPATIENT)
Age: 39
End: 2024-02-12

## 2024-02-12 LAB
25(OH)D3 SERPL-MCNC: 21.9 NG/ML
ALBUMIN SERPL ELPH-MCNC: 4.1 G/DL
ALP BLD-CCNC: 72 U/L
ALT SERPL-CCNC: 11 U/L
ANION GAP SERPL CALC-SCNC: 10 MMOL/L
AST SERPL-CCNC: 11 U/L
BASOPHILS # BLD AUTO: 0.04 K/UL
BASOPHILS NFR BLD AUTO: 0.5 %
BILIRUB SERPL-MCNC: 0.2 MG/DL
BUN SERPL-MCNC: 12 MG/DL
CALCIUM SERPL-MCNC: 8.9 MG/DL
CHLORIDE SERPL-SCNC: 104 MMOL/L
CHOLEST SERPL-MCNC: 202 MG/DL
CO2 SERPL-SCNC: 26 MMOL/L
CORTIS SERPL-MCNC: 12.1 UG/DL
CREAT SERPL-MCNC: 0.72 MG/DL
EGFR: 109 ML/MIN/1.73M2
EOSINOPHIL # BLD AUTO: 0.13 K/UL
EOSINOPHIL NFR BLD AUTO: 1.7 %
ESTIMATED AVERAGE GLUCOSE: 114 MG/DL
FOLATE SERPL-MCNC: 3.9 NG/ML
GLUCOSE SERPL-MCNC: 103 MG/DL
HBA1C MFR BLD HPLC: 5.6 %
HCG SERPL-MCNC: <1 MIU/ML
HCT VFR BLD CALC: 34.7 %
HDLC SERPL-MCNC: 54 MG/DL
HGB BLD-MCNC: 11.4 G/DL
IMM GRANULOCYTES NFR BLD AUTO: 0.4 %
INSULIN P FAST SERPL-ACNC: 17 UU/ML
LDLC SERPL CALC-MCNC: 125 MG/DL
LYMPHOCYTES # BLD AUTO: 2.35 K/UL
LYMPHOCYTES NFR BLD AUTO: 30.6 %
MAN DIFF?: NORMAL
MCHC RBC-ENTMCNC: 28.3 PG
MCHC RBC-ENTMCNC: 32.9 GM/DL
MCV RBC AUTO: 86.1 FL
MONOCYTES # BLD AUTO: 0.85 K/UL
MONOCYTES NFR BLD AUTO: 11.1 %
NEUTROPHILS # BLD AUTO: 4.27 K/UL
NEUTROPHILS NFR BLD AUTO: 55.7 %
NONHDLC SERPL-MCNC: 148 MG/DL
PLATELET # BLD AUTO: 273 K/UL
POTASSIUM SERPL-SCNC: 4 MMOL/L
PROT SERPL-MCNC: 6.4 G/DL
RBC # BLD: 4.03 M/UL
RBC # FLD: 13.8 %
SODIUM SERPL-SCNC: 141 MMOL/L
T4 FREE SERPL-MCNC: 1.1 NG/DL
T4 SERPL-MCNC: 8 UG/DL
THYROGLOB AB SERPL-ACNC: <20 IU/ML
THYROPEROXIDASE AB SERPL IA-ACNC: <10 IU/ML
TRIGL SERPL-MCNC: 131 MG/DL
TSH SERPL-ACNC: 1.67 UIU/ML
VIT B12 SERPL-MCNC: 450 PG/ML
WBC # FLD AUTO: 7.67 K/UL

## 2024-02-21 ENCOUNTER — TRANSCRIPTION ENCOUNTER (OUTPATIENT)
Age: 39
End: 2024-02-21

## 2024-03-01 ENCOUNTER — NON-APPOINTMENT (OUTPATIENT)
Age: 39
End: 2024-03-01

## 2024-03-21 ENCOUNTER — TRANSCRIPTION ENCOUNTER (OUTPATIENT)
Age: 39
End: 2024-03-21

## 2024-03-22 ENCOUNTER — APPOINTMENT (OUTPATIENT)
Dept: PULMONOLOGY | Facility: CLINIC | Age: 39
End: 2024-03-22

## 2024-04-15 ENCOUNTER — APPOINTMENT (OUTPATIENT)
Dept: PULMONOLOGY | Facility: CLINIC | Age: 39
End: 2024-04-15
Payer: MEDICAID

## 2024-04-15 VITALS
BODY MASS INDEX: 40.98 KG/M2 | WEIGHT: 255 LBS | OXYGEN SATURATION: 95 % | HEART RATE: 65 BPM | HEIGHT: 66 IN | TEMPERATURE: 98.9 F | DIASTOLIC BLOOD PRESSURE: 89 MMHG | SYSTOLIC BLOOD PRESSURE: 132 MMHG | RESPIRATION RATE: 15 BRPM

## 2024-04-15 DIAGNOSIS — R59.0 LOCALIZED ENLARGED LYMPH NODES: ICD-10-CM

## 2024-04-15 PROCEDURE — 99205 OFFICE O/P NEW HI 60 MIN: CPT

## 2024-04-21 LAB
ALBUMIN SERPL ELPH-MCNC: 4.7 G/DL
ALP BLD-CCNC: 70 U/L
ALT SERPL-CCNC: 14 U/L
ANION GAP SERPL CALC-SCNC: 13 MMOL/L
APTT BLD: 31.7 SEC
AST SERPL-CCNC: 16 U/L
BASOPHILS # BLD AUTO: 0.04 K/UL
BASOPHILS NFR BLD AUTO: 0.5 %
BILIRUB SERPL-MCNC: 0.6 MG/DL
BUN SERPL-MCNC: 9 MG/DL
CALCIUM SERPL-MCNC: 9.6 MG/DL
CHLORIDE SERPL-SCNC: 100 MMOL/L
CO2 SERPL-SCNC: 26 MMOL/L
CREAT SERPL-MCNC: 0.73 MG/DL
EGFR: 107 ML/MIN/1.73M2
EOSINOPHIL # BLD AUTO: 0.04 K/UL
EOSINOPHIL NFR BLD AUTO: 0.5 %
GLUCOSE SERPL-MCNC: 84 MG/DL
HCT VFR BLD CALC: 36.3 %
HGB BLD-MCNC: 11.9 G/DL
IMM GRANULOCYTES NFR BLD AUTO: 0.4 %
INR PPP: 0.91 RATIO
LYMPHOCYTES # BLD AUTO: 2.4 K/UL
LYMPHOCYTES NFR BLD AUTO: 29.4 %
MAN DIFF?: NORMAL
MCHC RBC-ENTMCNC: 27.2 PG
MCHC RBC-ENTMCNC: 32.8 GM/DL
MCV RBC AUTO: 83.1 FL
MONOCYTES # BLD AUTO: 0.68 K/UL
MONOCYTES NFR BLD AUTO: 8.3 %
NEUTROPHILS # BLD AUTO: 4.98 K/UL
NEUTROPHILS NFR BLD AUTO: 60.9 %
PLATELET # BLD AUTO: 307 K/UL
POTASSIUM SERPL-SCNC: 4.5 MMOL/L
PROT SERPL-MCNC: 7 G/DL
PT BLD: 10.3 SEC
RBC # BLD: 4.37 M/UL
RBC # FLD: 13.4 %
SODIUM SERPL-SCNC: 139 MMOL/L
WBC # FLD AUTO: 8.17 K/UL

## 2024-05-02 PROBLEM — Z00.00 ENCOUNTER FOR PREVENTIVE HEALTH EXAMINATION: Status: ACTIVE | Noted: 2024-05-02

## 2024-05-08 PROBLEM — R59.0 HILAR ADENOPATHY: Status: ACTIVE | Noted: 2024-04-15

## 2024-05-08 NOTE — REASON FOR VISIT
[Consultation] : a consultation [TextBox_44] : IP consult for adenopathy [TextBox_13] : Dr. Sherif Feliz

## 2024-05-08 NOTE — PHYSICAL EXAM
[No Acute Distress] : no acute distress [Normal Oropharynx] : normal oropharynx [Normal Appearance] : normal appearance [No Neck Mass] : no neck mass [Normal Rate/Rhythm] : normal rate/rhythm [Normal S1, S2] : normal s1, s2 [No Resp Distress] : no resp distress [No Acc Muscle Use] : no acc muscle use [Clear to Auscultation Bilaterally] : clear to auscultation bilaterally [No Abnormalities] : no abnormalities [Benign] : benign [Normal Gait] : normal gait [No Clubbing] : no clubbing [Normal Color/ Pigmentation] : normal color/ pigmentation [No Focal Deficits] : no focal deficits [No Motor Deficits] : no motor deficits [Oriented x3] : oriented x3 [Normal Mood] : normal mood [Normal Affect] : normal affect

## 2024-05-08 NOTE — CONSULT LETTER
[Dear  ___] : Dear  [unfilled], [Consult Letter:] : I had the pleasure of evaluating your patient, [unfilled]. [Please see my note below.] : Please see my note below. [Consult Closing:] : Thank you very much for allowing me to participate in the care of this patient.  If you have any questions, please do not hesitate to contact me. [Sincerely,] : Sincerely, [FreeTextEntry3] : Corwin Diaz MD Director of Interventional Pulmonology & Bronchoscopy .  Division of Pulmonary, Critical Care & Sleep Medicine [facility].

## 2024-05-08 NOTE — HISTORY OF PRESENT ILLNESS
[Never] : never [TextBox_4] : Interventional Pulmonology Consultation/Visit Note  This is a 38y/o F referred to the Interventional Pulmonology clinic for evaluation of the adenopathy. Patient has a history of gastric band, complicated by slippage, requiring removal on 11/14/2023. On evaluation, she was noted to have adenopathy, and subsequent PET CT also demonstrated the same and she was referred to the IP clinic to establish tissue diagnosis.   Denies chest pain, cough, hemoptysis, dyspnea at rest, dyspnea on exertion, pleuritic pain, wheezing, stridor or other obvious pulmonary symptoms. Rest of the review of systems is negative apart from those findings mentioned above.

## 2024-06-21 ENCOUNTER — OUTPATIENT (OUTPATIENT)
Dept: OUTPATIENT SERVICES | Facility: HOSPITAL | Age: 39
LOS: 1 days | End: 2024-06-21

## 2024-06-21 VITALS
WEIGHT: 261.03 LBS | HEIGHT: 66 IN | TEMPERATURE: 97 F | DIASTOLIC BLOOD PRESSURE: 99 MMHG | OXYGEN SATURATION: 97 % | HEART RATE: 90 BPM | RESPIRATION RATE: 16 BRPM | SYSTOLIC BLOOD PRESSURE: 139 MMHG

## 2024-06-21 DIAGNOSIS — Z98.84 BARIATRIC SURGERY STATUS: Chronic | ICD-10-CM

## 2024-06-21 DIAGNOSIS — R59.0 LOCALIZED ENLARGED LYMPH NODES: ICD-10-CM

## 2024-06-21 DIAGNOSIS — R59.9 ENLARGED LYMPH NODES, UNSPECIFIED: ICD-10-CM

## 2024-06-21 LAB
HCG UR QL: NEGATIVE — SIGNIFICANT CHANGE UP
HCT VFR BLD CALC: 32.8 % — LOW (ref 34.5–45)
HGB BLD-MCNC: 11.7 G/DL — SIGNIFICANT CHANGE UP (ref 11.5–15.5)
MCHC RBC-ENTMCNC: 27 PG — SIGNIFICANT CHANGE UP (ref 27–34)
MCHC RBC-ENTMCNC: 35.7 GM/DL — SIGNIFICANT CHANGE UP (ref 32–36)
MCV RBC AUTO: 75.6 FL — LOW (ref 80–100)
NRBC # BLD: 0 /100 WBCS — SIGNIFICANT CHANGE UP (ref 0–0)
NRBC # FLD: 0 K/UL — SIGNIFICANT CHANGE UP (ref 0–0)
PLATELET # BLD AUTO: 286 K/UL — SIGNIFICANT CHANGE UP (ref 150–400)
RBC # BLD: 4.34 M/UL — SIGNIFICANT CHANGE UP (ref 3.8–5.2)
RBC # FLD: 13.4 % — SIGNIFICANT CHANGE UP (ref 10.3–14.5)
WBC # BLD: 6.57 K/UL — SIGNIFICANT CHANGE UP (ref 3.8–10.5)
WBC # FLD AUTO: 6.57 K/UL — SIGNIFICANT CHANGE UP (ref 3.8–10.5)

## 2024-06-27 ENCOUNTER — RESULT REVIEW (OUTPATIENT)
Age: 39
End: 2024-06-27

## 2024-06-27 ENCOUNTER — APPOINTMENT (OUTPATIENT)
Dept: PULMONOLOGY | Facility: HOSPITAL | Age: 39
End: 2024-06-27

## 2024-06-27 ENCOUNTER — TRANSCRIPTION ENCOUNTER (OUTPATIENT)
Age: 39
End: 2024-06-27

## 2024-06-27 ENCOUNTER — OUTPATIENT (OUTPATIENT)
Dept: OUTPATIENT SERVICES | Facility: HOSPITAL | Age: 39
LOS: 1 days | Discharge: HOME | End: 2024-06-27
Payer: MEDICAID

## 2024-06-27 VITALS
HEART RATE: 73 BPM | HEIGHT: 67 IN | OXYGEN SATURATION: 97 % | TEMPERATURE: 98 F | WEIGHT: 261.03 LBS | RESPIRATION RATE: 16 BRPM | DIASTOLIC BLOOD PRESSURE: 94 MMHG | SYSTOLIC BLOOD PRESSURE: 147 MMHG

## 2024-06-27 VITALS
HEART RATE: 79 BPM | SYSTOLIC BLOOD PRESSURE: 136 MMHG | RESPIRATION RATE: 16 BRPM | OXYGEN SATURATION: 97 % | DIASTOLIC BLOOD PRESSURE: 90 MMHG

## 2024-06-27 DIAGNOSIS — Z98.84 BARIATRIC SURGERY STATUS: Chronic | ICD-10-CM

## 2024-06-27 DIAGNOSIS — R59.0 LOCALIZED ENLARGED LYMPH NODES: ICD-10-CM

## 2024-06-27 LAB
B PERT IGG+IGM PNL SER: ABNORMAL
COLOR FLD: ABNORMAL
EOSINOPHIL # FLD: 0 % — SIGNIFICANT CHANGE UP
FLUID INTAKE SUBSTANCE CLASS: SIGNIFICANT CHANGE UP
FOLATE+VIT B12 SERBLD-IMP: 0 % — SIGNIFICANT CHANGE UP
HCG UR QL: NEGATIVE — SIGNIFICANT CHANGE UP
LYMPHOCYTES # FLD: 7 % — SIGNIFICANT CHANGE UP
MESOTHL CELL # FLD: 0 % — SIGNIFICANT CHANGE UP
MONOS+MACROS # FLD: 49 % — SIGNIFICANT CHANGE UP
NEUTROPHILS-BODY FLUID: 29 % — SIGNIFICANT CHANGE UP
OTHER CELLS FLD MANUAL: 20 % — SIGNIFICANT CHANGE UP
RCV VOL RI: SIGNIFICANT CHANGE UP CELLS/UL (ref 0–5)
SPECIMEN SOURCE FLD: SIGNIFICANT CHANGE UP
TOTAL CELLS COUNTED, BODY FLUID: 100 CELLS — SIGNIFICANT CHANGE UP
TOTAL NUCLEATED CELL COUNT, BODY FLUID: SIGNIFICANT CHANGE UP CELLS/UL (ref 0–5)
TUBE TYPE: SIGNIFICANT CHANGE UP

## 2024-06-27 PROCEDURE — 88305 TISSUE EXAM BY PATHOLOGIST: CPT | Mod: 26

## 2024-06-27 PROCEDURE — 88112 CYTOPATH CELL ENHANCE TECH: CPT | Mod: 26,59

## 2024-06-27 PROCEDURE — 31645 BRNCHSC W/THER ASPIR 1ST: CPT | Mod: GC

## 2024-06-27 PROCEDURE — 88189 FLOWCYTOMETRY/READ 16 & >: CPT

## 2024-06-27 PROCEDURE — 31652 BRONCH EBUS SAMPLNG 1/2 NODE: CPT | Mod: GC

## 2024-06-27 PROCEDURE — 88173 CYTOPATH EVAL FNA REPORT: CPT | Mod: 26

## 2024-06-27 PROCEDURE — 31624 DX BRONCHOSCOPE/LAVAGE: CPT | Mod: GC

## 2024-06-27 RX ORDER — ONDANSETRON HYDROCHLORIDE 2 MG/ML
4 INJECTION INTRAMUSCULAR; INTRAVENOUS ONCE
Refills: 0 | Status: DISCONTINUED | OUTPATIENT
Start: 2024-06-27 | End: 2024-07-12

## 2024-06-27 RX ORDER — FENTANYL CITRATE 50 UG/ML
25 INJECTION, SOLUTION INTRAMUSCULAR; INTRAVENOUS
Refills: 0 | Status: DISCONTINUED | OUTPATIENT
Start: 2024-06-27 | End: 2024-06-27

## 2024-06-28 LAB
COMMENT - FLUIDS: SIGNIFICANT CHANGE UP
GRAM STN FLD: SIGNIFICANT CHANGE UP
GRAM STN FLD: SIGNIFICANT CHANGE UP
NIGHT BLUE STAIN TISS: SIGNIFICANT CHANGE UP
NIGHT BLUE STAIN TISS: SIGNIFICANT CHANGE UP
SPECIMEN SOURCE: SIGNIFICANT CHANGE UP

## 2024-06-29 LAB
CULTURE RESULTS: SIGNIFICANT CHANGE UP
FUNGITELL B-D-GLUCAN,  BRONCHIAL LAVAGE: SIGNIFICANT CHANGE UP
SPECIMEN SOURCE: SIGNIFICANT CHANGE UP

## 2024-06-30 LAB — GRAM STN FLD: ABNORMAL

## 2024-07-02 LAB
-  CEFTRIAXONE: SIGNIFICANT CHANGE UP
-  PENICILLIN: SIGNIFICANT CHANGE UP
-  VANCOMYCIN: SIGNIFICANT CHANGE UP
CULTURE RESULTS: ABNORMAL
GALACTOMANNAN AG SERPL-ACNC: 1.22 INDEX — HIGH (ref 0–0.49)
METHOD TYPE: SIGNIFICANT CHANGE UP
NON-GYNECOLOGICAL CYTOLOGY STUDY: SIGNIFICANT CHANGE UP
ORGANISM # SPEC MICROSCOPIC CNT: ABNORMAL
ORGANISM # SPEC MICROSCOPIC CNT: ABNORMAL
SPECIMEN SOURCE: SIGNIFICANT CHANGE UP

## 2024-07-03 LAB — TM INTERPRETATION: SIGNIFICANT CHANGE UP

## 2024-07-09 RX ORDER — AMOXICILLIN AND CLAVULANATE POTASSIUM 500; 125 MG/1; MG/1
500-125 TABLET, FILM COATED ORAL
Qty: 14 | Refills: 0 | Status: ACTIVE | COMMUNITY
Start: 2024-07-09 | End: 1900-01-01

## 2025-02-03 ENCOUNTER — NON-APPOINTMENT (OUTPATIENT)
Age: 40
End: 2025-02-03

## 2025-02-03 ENCOUNTER — APPOINTMENT (OUTPATIENT)
Dept: ORTHOPEDIC SURGERY | Facility: CLINIC | Age: 40
End: 2025-02-03
Payer: MEDICAID

## 2025-02-03 VITALS — BODY MASS INDEX: 34.86 KG/M2 | HEIGHT: 68 IN | WEIGHT: 230 LBS

## 2025-02-03 DIAGNOSIS — M25.561 PAIN IN RIGHT KNEE: ICD-10-CM

## 2025-02-03 PROBLEM — E66.9 OBESITY, UNSPECIFIED: Chronic | Status: ACTIVE | Noted: 2024-06-21

## 2025-02-03 PROBLEM — R59.9 ENLARGED LYMPH NODES, UNSPECIFIED: Chronic | Status: ACTIVE | Noted: 2024-06-21

## 2025-02-03 PROCEDURE — 99203 OFFICE O/P NEW LOW 30 MIN: CPT | Mod: 25

## 2025-02-03 PROCEDURE — 73562 X-RAY EXAM OF KNEE 3: CPT | Mod: RT

## 2025-02-06 ENCOUNTER — APPOINTMENT (OUTPATIENT)
Dept: ORTHOPEDIC SURGERY | Facility: CLINIC | Age: 40
End: 2025-02-06
Payer: MEDICAID

## 2025-02-06 VITALS — HEIGHT: 68 IN | BODY MASS INDEX: 34.86 KG/M2 | WEIGHT: 230 LBS

## 2025-02-06 DIAGNOSIS — M17.11 UNILATERAL PRIMARY OSTEOARTHRITIS, RIGHT KNEE: ICD-10-CM

## 2025-02-06 PROCEDURE — 99204 OFFICE O/P NEW MOD 45 MIN: CPT | Mod: 25

## 2025-02-06 PROCEDURE — 20610 DRAIN/INJ JOINT/BURSA W/O US: CPT | Mod: RT

## 2025-04-26 ENCOUNTER — EMERGENCY (EMERGENCY)
Facility: HOSPITAL | Age: 40
LOS: 0 days | Discharge: HOME | End: 2025-04-27
Attending: STUDENT IN AN ORGANIZED HEALTH CARE EDUCATION/TRAINING PROGRAM
Payer: MEDICAID

## 2025-04-26 VITALS
HEIGHT: 67 IN | WEIGHT: 240.08 LBS | RESPIRATION RATE: 18 BRPM | HEART RATE: 82 BPM | DIASTOLIC BLOOD PRESSURE: 94 MMHG | OXYGEN SATURATION: 100 % | TEMPERATURE: 99 F | SYSTOLIC BLOOD PRESSURE: 137 MMHG

## 2025-04-26 VITALS
DIASTOLIC BLOOD PRESSURE: 93 MMHG | OXYGEN SATURATION: 99 % | SYSTOLIC BLOOD PRESSURE: 137 MMHG | RESPIRATION RATE: 18 BRPM | TEMPERATURE: 99 F | HEART RATE: 76 BPM

## 2025-04-26 DIAGNOSIS — R51.9 HEADACHE, UNSPECIFIED: ICD-10-CM

## 2025-04-26 DIAGNOSIS — Z88.6 ALLERGY STATUS TO ANALGESIC AGENT: ICD-10-CM

## 2025-04-26 DIAGNOSIS — Z98.84 BARIATRIC SURGERY STATUS: Chronic | ICD-10-CM

## 2025-04-26 LAB
ALBUMIN SERPL ELPH-MCNC: 3.4 G/DL — SIGNIFICANT CHANGE UP (ref 3.3–5)
ALP SERPL-CCNC: 56 U/L — SIGNIFICANT CHANGE UP (ref 40–120)
ALT FLD-CCNC: 17 U/L — SIGNIFICANT CHANGE UP (ref 12–78)
ANION GAP SERPL CALC-SCNC: 2 MMOL/L — LOW (ref 5–17)
AST SERPL-CCNC: 12 U/L — LOW (ref 15–37)
BASOPHILS # BLD AUTO: 0.04 K/UL — SIGNIFICANT CHANGE UP (ref 0–0.2)
BASOPHILS NFR BLD AUTO: 0.4 % — SIGNIFICANT CHANGE UP (ref 0–2)
BILIRUB SERPL-MCNC: 0.5 MG/DL — SIGNIFICANT CHANGE UP (ref 0.2–1.2)
BUN SERPL-MCNC: 14 MG/DL — SIGNIFICANT CHANGE UP (ref 7–23)
CALCIUM SERPL-MCNC: 8.8 MG/DL — SIGNIFICANT CHANGE UP (ref 8.5–10.1)
CHLORIDE SERPL-SCNC: 107 MMOL/L — SIGNIFICANT CHANGE UP (ref 96–108)
CO2 SERPL-SCNC: 28 MMOL/L — SIGNIFICANT CHANGE UP (ref 22–31)
CREAT SERPL-MCNC: 0.81 MG/DL — SIGNIFICANT CHANGE UP (ref 0.5–1.3)
EGFR: 94 ML/MIN/1.73M2 — SIGNIFICANT CHANGE UP
EGFR: 94 ML/MIN/1.73M2 — SIGNIFICANT CHANGE UP
EOSINOPHIL # BLD AUTO: 0.08 K/UL — SIGNIFICANT CHANGE UP (ref 0–0.5)
EOSINOPHIL NFR BLD AUTO: 0.8 % — SIGNIFICANT CHANGE UP (ref 0–6)
GLUCOSE SERPL-MCNC: 98 MG/DL — SIGNIFICANT CHANGE UP (ref 70–99)
HCG SERPL-ACNC: <1 MIU/ML — SIGNIFICANT CHANGE UP
HCG UR QL: NEGATIVE — SIGNIFICANT CHANGE UP
HCT VFR BLD CALC: 37.6 % — SIGNIFICANT CHANGE UP (ref 34.5–45)
HGB BLD-MCNC: 12.4 G/DL — SIGNIFICANT CHANGE UP (ref 11.5–15.5)
IMM GRANULOCYTES NFR BLD AUTO: 0.3 % — SIGNIFICANT CHANGE UP (ref 0–0.9)
LYMPHOCYTES # BLD AUTO: 3.32 K/UL — HIGH (ref 1–3.3)
LYMPHOCYTES # BLD AUTO: 33.8 % — SIGNIFICANT CHANGE UP (ref 13–44)
MCHC RBC-ENTMCNC: 27.7 PG — SIGNIFICANT CHANGE UP (ref 27–34)
MCHC RBC-ENTMCNC: 33 G/DL — SIGNIFICANT CHANGE UP (ref 32–36)
MCV RBC AUTO: 83.9 FL — SIGNIFICANT CHANGE UP (ref 80–100)
MONOCYTES # BLD AUTO: 1.03 K/UL — HIGH (ref 0–0.9)
MONOCYTES NFR BLD AUTO: 10.5 % — SIGNIFICANT CHANGE UP (ref 2–14)
NEUTROPHILS # BLD AUTO: 5.32 K/UL — SIGNIFICANT CHANGE UP (ref 1.8–7.4)
NEUTROPHILS NFR BLD AUTO: 54.2 % — SIGNIFICANT CHANGE UP (ref 43–77)
NRBC BLD AUTO-RTO: 0 /100 WBCS — SIGNIFICANT CHANGE UP (ref 0–0)
PLATELET # BLD AUTO: 305 K/UL — SIGNIFICANT CHANGE UP (ref 150–400)
POTASSIUM SERPL-MCNC: 4 MMOL/L — SIGNIFICANT CHANGE UP (ref 3.5–5.3)
POTASSIUM SERPL-SCNC: 4 MMOL/L — SIGNIFICANT CHANGE UP (ref 3.5–5.3)
PROT SERPL-MCNC: 6.9 GM/DL — SIGNIFICANT CHANGE UP (ref 6–8.3)
RBC # BLD: 4.48 M/UL — SIGNIFICANT CHANGE UP (ref 3.8–5.2)
RBC # FLD: 13.2 % — SIGNIFICANT CHANGE UP (ref 10.3–14.5)
SODIUM SERPL-SCNC: 137 MMOL/L — SIGNIFICANT CHANGE UP (ref 135–145)
WBC # BLD: 9.82 K/UL — SIGNIFICANT CHANGE UP (ref 3.8–10.5)
WBC # FLD AUTO: 9.82 K/UL — SIGNIFICANT CHANGE UP (ref 3.8–10.5)

## 2025-04-26 PROCEDURE — 70481 CT ORBIT/EAR/FOSSA W/DYE: CPT | Mod: 26

## 2025-04-26 PROCEDURE — 99285 EMERGENCY DEPT VISIT HI MDM: CPT

## 2025-06-04 NOTE — PRE-OP CHECKLIST - AS BP NONINV SITE
Department of Anesthesiology  Preprocedure Note       Name:  Jelani Felipe   Age:  76 y.o.  :  1948                                          MRN:  [id number]         Date:  2025      Surgeon: Surgeon(s):  Kranthi German MD    Procedure: Procedure(s):  COLONOSCOPY    Medications prior to admission:   Prior to Admission medications    Medication Sig Start Date End Date Taking? Authorizing Provider   omeprazole (PRILOSEC) 40 MG delayed release capsule TAKE 1 CAPSULE BY MOUTH DAILY 25  Yes Татьяна Bailey MD   doxazosin (CARDURA) 1 MG tablet Take 1 tablet by mouth daily 3/26/25  Yes Татьяна Bailey MD   hydroCHLOROthiazide 12.5 MG capsule Take 1 capsule by mouth every morning 3/26/25  Yes Татьяна Bailey MD   atorvastatin (LIPITOR) 10 MG tablet TAKE 1 TABLET BY MOUTH DAILY 3/25/25  Yes Татьяна Bailey MD   lisinopril (PRINIVIL;ZESTRIL) 40 MG tablet TAKE 1 TABLET BY MOUTH DAILY 3/17/25  Yes Татьяна Bailey MD   metFORMIN (GLUCOPHAGE) 1000 MG tablet TAKE 1 TABLET BY MOUTH TWICE  DAILY 3/12/25  Yes Татьяна Bailey MD   amLODIPine (NORVASC) 10 MG tablet Take 1 tablet by mouth daily 25  Yes Татьяна Bailey MD   meloxicam (MOBIC) 15 MG tablet TAKE 1 TABLET BY MOUTH DAILY 25  Yes Татьяна Bailey MD   glimepiride (AMARYL) 2 MG tablet TAKE 1 TABLET BY MOUTH IN THE  MORNING BEFORE BREAKFAST 24  Yes Татьяна Bailey MD   fluticasone (FLONASE) 50 MCG/ACT nasal spray USE 2 SPRAYS NASALLY DAILY 3/7/24  Yes Татьяна Bailey MD   chlorhexidine (PERIDEX) 0.12 % solution  24   Provider, MD Sindhu   sodium chloride 5 % ophthalmic ointment Apply to eye daily  Patient not taking: Reported on 2025    Automatic Reconciliation, Ar       Current medications:    Current Facility-Administered Medications   Medication Dose Route Frequency Provider Last Rate Last Admin   • sodium chloride flush 0.9 % injection 5-40 mL  5-40 mL IntraVENous PRN Guy, Moncho L, APRN - CRNA       • lactated  left upper arm

## 2025-06-23 ENCOUNTER — APPOINTMENT (OUTPATIENT)
Dept: OTOLARYNGOLOGY | Facility: CLINIC | Age: 40
End: 2025-06-23
Payer: MEDICAID

## 2025-06-23 VITALS
HEART RATE: 67 BPM | DIASTOLIC BLOOD PRESSURE: 119 MMHG | HEIGHT: 68 IN | SYSTOLIC BLOOD PRESSURE: 161 MMHG | OXYGEN SATURATION: 99 % | BODY MASS INDEX: 34.86 KG/M2 | WEIGHT: 230 LBS

## 2025-06-23 PROBLEM — J38.3 VOCAL FOLD SCAR: Status: ACTIVE | Noted: 2025-06-23

## 2025-06-23 PROBLEM — R49.0 DYSPHONIA: Status: ACTIVE | Noted: 2025-06-23

## 2025-06-23 PROCEDURE — 99203 OFFICE O/P NEW LOW 30 MIN: CPT | Mod: 25

## 2025-06-23 PROCEDURE — 31579 LARYNGOSCOPY TELESCOPIC: CPT

## 2025-07-22 NOTE — DISCHARGE NOTE PROVIDER - DISCHARGE SERVICE FOR PATIENT
29.8 on the discharge service for the patient. I have reviewed and made amendments to the documentation where necessary.

## 2025-08-20 ENCOUNTER — NON-APPOINTMENT (OUTPATIENT)
Age: 40
End: 2025-08-20

## 2025-09-03 ENCOUNTER — NON-APPOINTMENT (OUTPATIENT)
Age: 40
End: 2025-09-03

## 2025-09-08 ENCOUNTER — NON-APPOINTMENT (OUTPATIENT)
Age: 40
End: 2025-09-08

## 2025-09-12 ENCOUNTER — EMERGENCY (EMERGENCY)
Facility: HOSPITAL | Age: 40
LOS: 0 days | Discharge: HOME | End: 2025-09-13
Attending: STUDENT IN AN ORGANIZED HEALTH CARE EDUCATION/TRAINING PROGRAM
Payer: MEDICAID

## 2025-09-12 VITALS
WEIGHT: 240.08 LBS | OXYGEN SATURATION: 99 % | HEART RATE: 78 BPM | TEMPERATURE: 98 F | RESPIRATION RATE: 25 BRPM | HEIGHT: 68 IN | DIASTOLIC BLOOD PRESSURE: 107 MMHG | SYSTOLIC BLOOD PRESSURE: 163 MMHG

## 2025-09-12 DIAGNOSIS — R05.9 COUGH, UNSPECIFIED: ICD-10-CM

## 2025-09-12 DIAGNOSIS — Z98.84 BARIATRIC SURGERY STATUS: Chronic | ICD-10-CM

## 2025-09-12 DIAGNOSIS — R07.9 CHEST PAIN, UNSPECIFIED: ICD-10-CM

## 2025-09-12 DIAGNOSIS — R03.0 ELEVATED BLOOD-PRESSURE READING, WITHOUT DIAGNOSIS OF HYPERTENSION: ICD-10-CM

## 2025-09-12 DIAGNOSIS — R06.02 SHORTNESS OF BREATH: ICD-10-CM

## 2025-09-12 DIAGNOSIS — Z88.6 ALLERGY STATUS TO ANALGESIC AGENT: ICD-10-CM

## 2025-09-12 LAB
BASE EXCESS BLDV CALC-SCNC: 3.9 MMOL/L — HIGH (ref -2–3)
BASOPHILS # BLD AUTO: 0.05 K/UL — SIGNIFICANT CHANGE UP (ref 0–0.2)
BASOPHILS NFR BLD AUTO: 0.5 % — SIGNIFICANT CHANGE UP (ref 0–2)
BLOOD GAS COMMENTS, VENOUS: SIGNIFICANT CHANGE UP
CO2 BLDV-SCNC: 30 MMOL/L — HIGH (ref 22–26)
EOSINOPHIL # BLD AUTO: 0.41 K/UL — SIGNIFICANT CHANGE UP (ref 0–0.5)
EOSINOPHIL NFR BLD AUTO: 4.2 % — SIGNIFICANT CHANGE UP (ref 0–6)
GAS PNL BLDV: SIGNIFICANT CHANGE UP
HCO3 BLDV-SCNC: 28 MMOL/L — SIGNIFICANT CHANGE UP (ref 22–28)
HCT VFR BLD CALC: 34.5 % — SIGNIFICANT CHANGE UP (ref 34.5–45)
HGB BLD-MCNC: 11.3 G/DL — LOW (ref 11.5–15.5)
IMM GRANULOCYTES NFR BLD AUTO: 0.6 % — SIGNIFICANT CHANGE UP (ref 0–0.9)
LYMPHOCYTES # BLD AUTO: 2.71 K/UL — SIGNIFICANT CHANGE UP (ref 1–3.3)
LYMPHOCYTES # BLD AUTO: 27.5 % — SIGNIFICANT CHANGE UP (ref 13–44)
MCHC RBC-ENTMCNC: 27.3 PG — SIGNIFICANT CHANGE UP (ref 27–34)
MCHC RBC-ENTMCNC: 32.8 G/DL — SIGNIFICANT CHANGE UP (ref 32–36)
MCV RBC AUTO: 83.3 FL — SIGNIFICANT CHANGE UP (ref 80–100)
MONOCYTES # BLD AUTO: 0.87 K/UL — SIGNIFICANT CHANGE UP (ref 0–0.9)
MONOCYTES NFR BLD AUTO: 8.8 % — SIGNIFICANT CHANGE UP (ref 2–14)
NEUTROPHILS # BLD AUTO: 5.75 K/UL — SIGNIFICANT CHANGE UP (ref 1.8–7.4)
NEUTROPHILS NFR BLD AUTO: 58.4 % — SIGNIFICANT CHANGE UP (ref 43–77)
NRBC BLD AUTO-RTO: 0 /100 WBCS — SIGNIFICANT CHANGE UP (ref 0–0)
PCO2 BLDV: 42 MMHG — SIGNIFICANT CHANGE UP (ref 42–55)
PH BLDV: 7.44 — HIGH (ref 7.32–7.43)
PLATELET # BLD AUTO: 263 K/UL — SIGNIFICANT CHANGE UP (ref 150–400)
PO2 BLDV: 31 MMHG — SIGNIFICANT CHANGE UP (ref 25–45)
RBC # BLD: 4.14 M/UL — SIGNIFICANT CHANGE UP (ref 3.8–5.2)
RBC # FLD: 13 % — SIGNIFICANT CHANGE UP (ref 10.3–14.5)
SAO2 % BLDV: 61.7 % — LOW (ref 94–98)
WBC # BLD: 9.85 K/UL — SIGNIFICANT CHANGE UP (ref 3.8–10.5)
WBC # FLD AUTO: 9.85 K/UL — SIGNIFICANT CHANGE UP (ref 3.8–10.5)

## 2025-09-12 PROCEDURE — 99285 EMERGENCY DEPT VISIT HI MDM: CPT

## 2025-09-12 PROCEDURE — 93010 ELECTROCARDIOGRAM REPORT: CPT

## 2025-09-13 VITALS
TEMPERATURE: 98 F | HEART RATE: 64 BPM | RESPIRATION RATE: 18 BRPM | OXYGEN SATURATION: 98 % | SYSTOLIC BLOOD PRESSURE: 139 MMHG | DIASTOLIC BLOOD PRESSURE: 84 MMHG

## 2025-09-13 LAB
ALBUMIN SERPL ELPH-MCNC: 3.4 G/DL — SIGNIFICANT CHANGE UP (ref 3.3–5)
ALP SERPL-CCNC: 68 U/L — SIGNIFICANT CHANGE UP (ref 40–120)
ALT FLD-CCNC: 21 U/L — SIGNIFICANT CHANGE UP (ref 12–78)
ANION GAP SERPL CALC-SCNC: 6 MMOL/L — SIGNIFICANT CHANGE UP (ref 5–17)
APPEARANCE UR: CLEAR — SIGNIFICANT CHANGE UP
AST SERPL-CCNC: 8 U/L — LOW (ref 15–37)
BACTERIA # UR AUTO: ABNORMAL /HPF
BILIRUB SERPL-MCNC: 0.4 MG/DL — SIGNIFICANT CHANGE UP (ref 0.2–1.2)
BILIRUB UR-MCNC: NEGATIVE — SIGNIFICANT CHANGE UP
BUN SERPL-MCNC: 14 MG/DL — SIGNIFICANT CHANGE UP (ref 7–23)
CALCIUM SERPL-MCNC: 8.7 MG/DL — SIGNIFICANT CHANGE UP (ref 8.5–10.1)
CHLORIDE SERPL-SCNC: 106 MMOL/L — SIGNIFICANT CHANGE UP (ref 96–108)
CO2 SERPL-SCNC: 29 MMOL/L — SIGNIFICANT CHANGE UP (ref 22–31)
COLOR SPEC: YELLOW — SIGNIFICANT CHANGE UP
CREAT SERPL-MCNC: 0.7 MG/DL — SIGNIFICANT CHANGE UP (ref 0.5–1.3)
DIFF PNL FLD: ABNORMAL
EGFR: 112 ML/MIN/1.73M2 — SIGNIFICANT CHANGE UP
EGFR: 112 ML/MIN/1.73M2 — SIGNIFICANT CHANGE UP
EPI CELLS # UR: PRESENT
GLUCOSE SERPL-MCNC: 103 MG/DL — HIGH (ref 70–99)
GLUCOSE UR QL: NEGATIVE MG/DL — SIGNIFICANT CHANGE UP
HCG SERPL-ACNC: <1 MIU/ML — SIGNIFICANT CHANGE UP
KETONES UR QL: NEGATIVE MG/DL — SIGNIFICANT CHANGE UP
LEUKOCYTE ESTERASE UR-ACNC: NEGATIVE — SIGNIFICANT CHANGE UP
MAGNESIUM SERPL-MCNC: 2.1 MG/DL — SIGNIFICANT CHANGE UP (ref 1.6–2.6)
NITRITE UR-MCNC: NEGATIVE — SIGNIFICANT CHANGE UP
NT-PROBNP SERPL-SCNC: 58 PG/ML — SIGNIFICANT CHANGE UP (ref 0–125)
PH UR: 8 — SIGNIFICANT CHANGE UP (ref 5–8)
POTASSIUM SERPL-MCNC: 4 MMOL/L — SIGNIFICANT CHANGE UP (ref 3.5–5.3)
POTASSIUM SERPL-SCNC: 4 MMOL/L — SIGNIFICANT CHANGE UP (ref 3.5–5.3)
PROT SERPL-MCNC: 6.6 GM/DL — SIGNIFICANT CHANGE UP (ref 6–8.3)
PROT UR-MCNC: NEGATIVE MG/DL — SIGNIFICANT CHANGE UP
RBC CASTS # UR COMP ASSIST: 4 /HPF — SIGNIFICANT CHANGE UP (ref 0–4)
SODIUM SERPL-SCNC: 141 MMOL/L — SIGNIFICANT CHANGE UP (ref 135–145)
SP GR SPEC: >1.03 — HIGH (ref 1–1.03)
TROPONIN I, HIGH SENSITIVITY RESULT: 4.1 NG/L — SIGNIFICANT CHANGE UP
UROBILINOGEN FLD QL: 0.2 MG/DL — SIGNIFICANT CHANGE UP (ref 0.2–1)
WBC UR QL: 1 /HPF — SIGNIFICANT CHANGE UP (ref 0–5)

## 2025-09-13 PROCEDURE — 71275 CT ANGIOGRAPHY CHEST: CPT | Mod: 26

## 2025-09-13 RX ORDER — ACETAMINOPHEN 500 MG/5ML
1000 LIQUID (ML) ORAL ONCE
Refills: 0 | Status: COMPLETED | OUTPATIENT
Start: 2025-09-13 | End: 2025-09-13

## 2025-09-13 RX ORDER — HYDROCODONE/HOMATROPINE
5 SYRUP ORAL
Qty: 300 | Refills: 0
Start: 2025-09-13 | End: 2025-09-17

## 2025-09-13 RX ORDER — ALBUTEROL SULFATE 2.5 MG/3ML
2 VIAL, NEBULIZER (ML) INHALATION
Qty: 21 | Refills: 0
Start: 2025-09-13 | End: 2025-10-12

## 2025-09-13 RX ADMIN — Medication 400 MILLIGRAM(S): at 05:40

## (undated) DEVICE — SOL IRR POUR H2O 500ML

## (undated) DEVICE — DRAPE TOWEL BLUE 17" X 24"

## (undated) DEVICE — VALVE BIOPSY BRONCHOVIDEOSCOPE

## (undated) DEVICE — SUT MONOCRYL 4-0 27" PS-2 UNDYED

## (undated) DEVICE — ELCTR STRYKER NEPTUNE SMOKE EVACUATION PENCIL (GREEN)

## (undated) DEVICE — TROCAR ETHICON ENDOPATH XCEL BLADELESS 5MM X 150MM STABILITY

## (undated) DEVICE — DRAPE 3/4 SHEET 52X76"

## (undated) DEVICE — ADAPTER FIBEROPTIC BRONCHOSCOPE DUAL AXIS SWIVEL

## (undated) DEVICE — SOL IRR POUR NS 0.9% 500ML

## (undated) DEVICE — BRUSH CYTO DISP

## (undated) DEVICE — FORCEP BIOPSY BRONCHOSCOPE DISP

## (undated) DEVICE — SUTURE REMOVAL KIT

## (undated) DEVICE — TROCAR COVIDIEN VERSAONE BLADELESS FIXATION 5MM STANDARD

## (undated) DEVICE — TUBING CANNULA SALTER LABS NASAL ADULT 7FT

## (undated) DEVICE — SYR LUER LOK 20CC

## (undated) DEVICE — SYR LUER LOK 10CC

## (undated) DEVICE — TRAP SPECIMEN SPUTUM 40CC

## (undated) DEVICE — DRSG TAPE TRANSPORE 1"

## (undated) DEVICE — SHEARS COVIDIEN ENDO SHEAR 5MM X 31CM W UNIPOLAR CAUTERY

## (undated) DEVICE — MASK SURGICAL WITH EYESHIELD ANTIFOG (ORANGE)

## (undated) DEVICE — LABELS BLANK W PEN

## (undated) DEVICE — SOL IRR NS 0.9% 250ML

## (undated) DEVICE — WARMING BLANKET LOWER ADULT

## (undated) DEVICE — NDL ASPIRATION VIZISHOT2 22G

## (undated) DEVICE — LIGASURE L-HOOK 44CM

## (undated) DEVICE — PACK BRONCHOSCOPY

## (undated) DEVICE — STOPCOCK 4-WAY (BLUE) DISCOFIX SPIN-LOCK CONNECTOR

## (undated) DEVICE — FRA-ESU BOVIE FORCE TRIAD T6D04777DX: Type: DURABLE MEDICAL EQUIPMENT

## (undated) DEVICE — VENODYNE/SCD SLEEVE CALF MEDIUM

## (undated) DEVICE — BALLOON SINGLE FOR BF-UC160F

## (undated) DEVICE — SOL INJ NS 0.9% 100ML

## (undated) DEVICE — GLV 8.5 PROTEXIS (WHITE)

## (undated) DEVICE — DRSG CURITY GAUZE SPONGE 4 X 4" 12-PLY

## (undated) DEVICE — SOL ANTI FOG

## (undated) DEVICE — GLV 7 PROTEXIS (WHITE)

## (undated) DEVICE — VALVE SUCTION EVIS 160/200/240

## (undated) DEVICE — TUBING STRYKEFLOW II SUCTION / IRRIGATOR

## (undated) DEVICE — FORCEP BIOPSY 1.8MM JAW X 100CM DISP

## (undated) DEVICE — SYR LUER SLIP TIP 30CC